# Patient Record
Sex: FEMALE | Race: ASIAN | NOT HISPANIC OR LATINO
[De-identification: names, ages, dates, MRNs, and addresses within clinical notes are randomized per-mention and may not be internally consistent; named-entity substitution may affect disease eponyms.]

---

## 2018-07-27 ENCOUNTER — APPOINTMENT (OUTPATIENT)
Dept: FAMILY MEDICINE | Facility: CLINIC | Age: 33
End: 2018-07-27
Payer: OTHER GOVERNMENT

## 2018-07-27 VITALS
OXYGEN SATURATION: 98 % | TEMPERATURE: 98.8 F | HEIGHT: 64 IN | DIASTOLIC BLOOD PRESSURE: 84 MMHG | SYSTOLIC BLOOD PRESSURE: 124 MMHG | WEIGHT: 139 LBS | BODY MASS INDEX: 23.73 KG/M2 | HEART RATE: 105 BPM

## 2018-07-27 DIAGNOSIS — Z78.9 OTHER SPECIFIED HEALTH STATUS: ICD-10-CM

## 2018-07-27 PROCEDURE — 99203 OFFICE O/P NEW LOW 30 MIN: CPT | Mod: 25

## 2018-07-27 PROCEDURE — G0444 DEPRESSION SCREEN ANNUAL: CPT | Mod: 59

## 2018-07-27 NOTE — PAST MEDICAL HISTORY
[Menstruating] : hx of menstruating [Approximately ___ (Month)] : the LMP was approximately [unfilled] month(s) ago

## 2018-07-27 NOTE — HISTORY OF PRESENT ILLNESS
[FreeTextEntry8] : here to establish. She found a lump in right lateral chest wall, mobile few months ago, it has increased in size since 2/2018, no pain . otherwise healthy.

## 2018-07-27 NOTE — COUNSELING
[Activity counseling provided] : activity [Behavioral health counseling provided] : behavioral health  [None] : None [Good understanding] : Patient has a good understanding of lifestyle changes and the steps needed to achieve self management goals [ - Annual Depression Screening] : Annual Depression Screening

## 2018-07-27 NOTE — PHYSICAL EXAM

## 2018-07-27 NOTE — HEALTH RISK ASSESSMENT
[No falls in past year] : Patient reported no falls in the past year [0] : 2) Feeling down, depressed, or hopeless: Not at all (0) [] : No [[id number]] : 0 Airway patent, Nasal mucosa clear. Mouth with normal mucosa. Throat has no vesicles, no oropharyngeal exudates and uvula is midline.

## 2018-07-30 LAB
ALBUMIN SERPL ELPH-MCNC: 4.5 G/DL
ALP BLD-CCNC: 70 U/L
ALT SERPL-CCNC: 10 U/L
ANION GAP SERPL CALC-SCNC: 15 MMOL/L
AST SERPL-CCNC: 19 U/L
BILIRUB SERPL-MCNC: 0.7 MG/DL
BUN SERPL-MCNC: 13 MG/DL
CALCIUM SERPL-MCNC: 9.7 MG/DL
CHLORIDE SERPL-SCNC: 100 MMOL/L
CO2 SERPL-SCNC: 26 MMOL/L
CREAT SERPL-MCNC: 0.77 MG/DL
GLUCOSE SERPL-MCNC: 85 MG/DL
POTASSIUM SERPL-SCNC: 4.2 MMOL/L
PROT SERPL-MCNC: 8.4 G/DL
SODIUM SERPL-SCNC: 141 MMOL/L

## 2018-08-01 ENCOUNTER — APPOINTMENT (OUTPATIENT)
Dept: SURGERY | Facility: CLINIC | Age: 33
End: 2018-08-01
Payer: OTHER GOVERNMENT

## 2018-08-01 ENCOUNTER — TRANSCRIPTION ENCOUNTER (OUTPATIENT)
Age: 33
End: 2018-08-01

## 2018-08-01 VITALS — BODY MASS INDEX: 26.4 KG/M2 | HEIGHT: 63 IN | WEIGHT: 149 LBS

## 2018-08-01 PROCEDURE — 99202 OFFICE O/P NEW SF 15 MIN: CPT

## 2018-08-03 LAB
APPEARANCE: ABNORMAL
BACTERIA: ABNORMAL
BASOPHILS # BLD AUTO: 0.01 K/UL
BASOPHILS NFR BLD AUTO: 0.2 %
BILIRUBIN URINE: NEGATIVE
BLOOD URINE: ABNORMAL
CHOLEST SERPL-MCNC: 175 MG/DL
CHOLEST/HDLC SERPL: 4.4 RATIO
COLOR: ABNORMAL
EOSINOPHIL # BLD AUTO: 0.06 K/UL
EOSINOPHIL NFR BLD AUTO: 1 %
FOLATE SERPL-MCNC: 16.4 NG/ML
GLUCOSE QUALITATIVE U: NEGATIVE MG/DL
HBA1C MFR BLD HPLC: 5.5 %
HCT VFR BLD CALC: 40.5 %
HDLC SERPL-MCNC: 40 MG/DL
HGB BLD-MCNC: 13.6 G/DL
HYALINE CASTS: 4 /LPF
IMM GRANULOCYTES NFR BLD AUTO: 0.2 %
KETONES URINE: ABNORMAL
LDLC SERPL CALC-MCNC: 114 MG/DL
LEUKOCYTE ESTERASE URINE: ABNORMAL
LYMPHOCYTES # BLD AUTO: 1.43 K/UL
LYMPHOCYTES NFR BLD AUTO: 22.7 %
MAN DIFF?: NORMAL
MCHC RBC-ENTMCNC: 30.5 PG
MCHC RBC-ENTMCNC: 33.6 GM/DL
MCV RBC AUTO: 90.8 FL
MICROSCOPIC-UA: NORMAL
MONOCYTES # BLD AUTO: 0.32 K/UL
MONOCYTES NFR BLD AUTO: 5.1 %
NEUTROPHILS # BLD AUTO: 4.47 K/UL
NEUTROPHILS NFR BLD AUTO: 70.8 %
NITRITE URINE: POSITIVE
PH URINE: 6
PLATELET # BLD AUTO: 332 K/UL
PROTEIN URINE: 100 MG/DL
RBC # BLD: 4.46 M/UL
RBC # FLD: 12.6 %
RED BLOOD CELLS URINE: 10 /HPF
SPECIFIC GRAVITY URINE: 1.03
SQUAMOUS EPITHELIAL CELLS: >27 /HPF
TRIGL SERPL-MCNC: 105 MG/DL
TSH SERPL-ACNC: 1.51 UIU/ML
URINE COMMENTS: NORMAL
UROBILINOGEN URINE: NEGATIVE MG/DL
VIT B12 SERPL-MCNC: 583 PG/ML
WBC # FLD AUTO: 6.3 K/UL
WHITE BLOOD CELLS URINE: 46 /HPF

## 2018-08-07 ENCOUNTER — LABORATORY RESULT (OUTPATIENT)
Age: 33
End: 2018-08-07

## 2018-08-08 ENCOUNTER — APPOINTMENT (OUTPATIENT)
Dept: SURGERY | Facility: CLINIC | Age: 33
End: 2018-08-08
Payer: OTHER GOVERNMENT

## 2018-08-08 ENCOUNTER — APPOINTMENT (OUTPATIENT)
Dept: FAMILY MEDICINE | Facility: CLINIC | Age: 33
End: 2018-08-08
Payer: OTHER GOVERNMENT

## 2018-08-08 VITALS
HEART RATE: 88 BPM | SYSTOLIC BLOOD PRESSURE: 120 MMHG | DIASTOLIC BLOOD PRESSURE: 78 MMHG | WEIGHT: 140 LBS | RESPIRATION RATE: 15 BRPM | TEMPERATURE: 98 F | BODY MASS INDEX: 24.8 KG/M2 | OXYGEN SATURATION: 98 % | HEIGHT: 63 IN

## 2018-08-08 PROCEDURE — 11402 EXC TR-EXT B9+MARG 1.1-2 CM: CPT

## 2018-08-08 PROCEDURE — 99214 OFFICE O/P EST MOD 30 MIN: CPT

## 2018-08-08 NOTE — COUNSELING
[Healthy eating counseling provided] : healthy eating [Activity counseling provided] : activity [Behavioral health counseling provided] : behavioral health  [___ min/wk activity recommended] : [unfilled] min/wk activity recommended [Walking] : Walking [Engage in a relaxing activity] : Engage in a relaxing activity [None] : None [Good understanding] : Patient has a good understanding of lifestyle changes and the steps needed to achieve self management goals

## 2018-08-08 NOTE — HISTORY OF PRESENT ILLNESS
[FreeTextEntry1] : here for f/u right chest wall mass.  [de-identified] : here for f/u right lateral chest wall mass.Mass is uncomfortable and will be removed today by surgeon.  denies fever, chills, urine pain burning, blood in urine, urgency. she has h/o proteinuria, so does her mother with normal renal function.

## 2018-08-08 NOTE — HEALTH RISK ASSESSMENT
[No falls in past year] : Patient reported no falls in the past year [0] : 2) Feeling down, depressed, or hopeless: Not at all (0) [] : No [[id number]] : 0

## 2018-08-08 NOTE — PLAN
[FreeTextEntry1] : surgical removal of lump today from right chest wall.  repeat blood tests, Urine test exclude infection, \par nephrology eval. \par  US kidneys to r/o structural abnormality. \par schedule CPE.

## 2018-08-13 LAB
ALBUMIN MFR SERPL ELPH: 56.5 %
ALBUMIN SERPL ELPH-MCNC: 4.4 G/DL
ALBUMIN SERPL-MCNC: 4.5 G/DL
ALBUMIN/GLOB SERPL: 1.3 RATIO
ALP BLD-CCNC: 66 U/L
ALPHA1 GLOB MFR SERPL ELPH: 4.1 %
ALPHA1 GLOB SERPL ELPH-MCNC: 0.3 G/DL
ALPHA2 GLOB MFR SERPL ELPH: 9.6 %
ALPHA2 GLOB SERPL ELPH-MCNC: 0.8 G/DL
ALT SERPL-CCNC: 11 U/L
ANION GAP SERPL CALC-SCNC: 13 MMOL/L
APPEARANCE: ABNORMAL
AST SERPL-CCNC: 16 U/L
B-GLOBULIN MFR SERPL ELPH: 10.7 %
B-GLOBULIN SERPL ELPH-MCNC: 0.8 G/DL
BACTERIA UR CULT: ABNORMAL
BACTERIA: ABNORMAL
BILIRUB SERPL-MCNC: 0.3 MG/DL
BILIRUBIN URINE: NEGATIVE
BLOOD URINE: ABNORMAL
BUN SERPL-MCNC: 17 MG/DL
C3 SERPL-MCNC: 115 MG/DL
C4 SERPL-MCNC: 44 MG/DL
CALCIUM OXALATE CRYSTALS: ABNORMAL
CALCIUM SERPL-MCNC: 9.4 MG/DL
CHLORIDE SERPL-SCNC: 101 MMOL/L
CO2 SERPL-SCNC: 25 MMOL/L
COLOR: YELLOW
CREAT SERPL-MCNC: 0.63 MG/DL
CREAT SPEC-SCNC: 192 MG/DL
CREAT SPEC-SCNC: 192 MG/DL
CREAT/PROT UR: 0.8 RATIO
CRP SERPL-MCNC: 0.26 MG/DL
DEPRECATED KAPPA LC FREE/LAMBDA SER: 0.97 RATIO
DEPRECATED KAPPA LC FREE/LAMBDA SER: 0.97 RATIO
ERYTHROCYTE [SEDIMENTATION RATE] IN BLOOD BY WESTERGREN METHOD: 27 MM/HR
GAMMA GLOB FLD ELPH-MCNC: 1.5 G/DL
GAMMA GLOB MFR SERPL ELPH: 19.1 %
GLUCOSE QUALITATIVE U: NEGATIVE MG/DL
GLUCOSE SERPL-MCNC: 81 MG/DL
HYALINE CASTS: 1 /LPF
IGA SER QL IEP: 178 MG/DL
IGG SER QL IEP: 1683 MG/DL
IGG4 SER-MCNC: 27.2 MG/DL
IGM SER QL IEP: 143 MG/DL
INTERPRETATION SERPL IEP-IMP: NORMAL
KAPPA LC CSF-MCNC: 1.82 MG/DL
KAPPA LC CSF-MCNC: 1.82 MG/DL
KAPPA LC SERPL-MCNC: 1.76 MG/DL
KAPPA LC SERPL-MCNC: 1.76 MG/DL
KETONES URINE: NEGATIVE
LEUKOCYTE ESTERASE URINE: NEGATIVE
M PROTEIN SPEC IFE-MCNC: NORMAL
MICROALBUMIN 24H UR DL<=1MG/L-MCNC: 87.1 MG/DL
MICROALBUMIN/CREAT 24H UR-RTO: 453 MG/G
MICROSCOPIC-UA: NORMAL
NITRITE URINE: NEGATIVE
PH URINE: 6.5
POTASSIUM SERPL-SCNC: 4.3 MMOL/L
PROT SERPL-MCNC: 7.9 G/DL
PROT UR-MCNC: 149 MG/DL
PROTEIN URINE: 300 MG/DL
RED BLOOD CELLS URINE: 71 /HPF
SODIUM SERPL-SCNC: 139 MMOL/L
SPECIFIC GRAVITY URINE: 1.03
SQUAMOUS EPITHELIAL CELLS: 14 /HPF
UROBILINOGEN URINE: 1 MG/DL
WHITE BLOOD CELLS URINE: 14 /HPF

## 2018-08-17 ENCOUNTER — APPOINTMENT (OUTPATIENT)
Dept: SURGERY | Facility: CLINIC | Age: 33
End: 2018-08-17
Payer: OTHER GOVERNMENT

## 2018-08-17 PROCEDURE — 99024 POSTOP FOLLOW-UP VISIT: CPT

## 2018-10-04 ENCOUNTER — APPOINTMENT (OUTPATIENT)
Dept: FAMILY MEDICINE | Facility: CLINIC | Age: 33
End: 2018-10-04
Payer: COMMERCIAL

## 2018-10-04 VITALS
HEIGHT: 64 IN | OXYGEN SATURATION: 98 % | BODY MASS INDEX: 24.59 KG/M2 | HEART RATE: 98 BPM | TEMPERATURE: 98.9 F | DIASTOLIC BLOOD PRESSURE: 84 MMHG | SYSTOLIC BLOOD PRESSURE: 124 MMHG | WEIGHT: 144 LBS

## 2018-10-04 DIAGNOSIS — Z23 ENCOUNTER FOR IMMUNIZATION: ICD-10-CM

## 2018-10-04 DIAGNOSIS — D17.9 BENIGN LIPOMATOUS NEOPLASM, UNSPECIFIED: ICD-10-CM

## 2018-10-04 DIAGNOSIS — Z12.4 ENCOUNTER FOR SCREENING FOR MALIGNANT NEOPLASM OF CERVIX: ICD-10-CM

## 2018-10-04 DIAGNOSIS — R77.8 OTHER SPECIFIED ABNORMALITIES OF PLASMA PROTEINS: ICD-10-CM

## 2018-10-04 PROCEDURE — G0008: CPT

## 2018-10-04 PROCEDURE — 99395 PREV VISIT EST AGE 18-39: CPT | Mod: 25

## 2018-10-04 PROCEDURE — G0444 DEPRESSION SCREEN ANNUAL: CPT

## 2018-10-04 PROCEDURE — 90656 IIV3 VACC NO PRSV 0.5 ML IM: CPT

## 2018-10-04 RX ORDER — NITROFURANTOIN (MONOHYDRATE/MACROCRYSTALS) 25; 75 MG/1; MG/1
100 CAPSULE ORAL
Qty: 14 | Refills: 0 | Status: DISCONTINUED | COMMUNITY
Start: 2018-08-15 | End: 2018-10-04

## 2018-10-04 NOTE — PLAN
[FreeTextEntry1] : preventive screening, healthy diet and exercise. \par hematology and nephrology. \par  f/u surgeon for 2 nd lipoma surgery. \par  flu vaccine given.

## 2018-10-04 NOTE — HEALTH RISK ASSESSMENT
[Good] : ~his/her~  mood as  good [Two or more falls in past year] : Patient reported two or more falls in the past year [0] : 2) Feeling down, depressed, or hopeless: Not at all (0) [Discussed at today's visit] : Advance Directives Discussed at today's visit [No changes since last discussed ___] : No changes since last discussed  [unfilled] [HIV Test offered] : HIV Test offered [Hepatitis C test offered] : Hepatitis C test offered [Feels Safe at Home] : Feels safe at home [Fully functional (bathing, dressing, toileting, transferring, walking, feeding)] : Fully functional (bathing, dressing, toileting, transferring, walking, feeding) [Fully functional (using the telephone, shopping, preparing meals, housekeeping, doing laundry, using] : Fully functional and needs no help or supervision to perform IADLs (using the telephone, shopping, preparing meals, housekeeping, doing laundry, using transportation, managing medications and managing finances) [Independent] : managing finances [None] : None [With Family] : lives with family [Employed] : employed [] :  [# Of Children ___] : has [unfilled] children [Sexually Active] : sexually active [Smoke Detector] : smoke detector [Carbon Monoxide Detector] : carbon monoxide detector [Seat Belt] :  uses seat belt [Sunscreen] : uses sunscreen [] : No [[id number]] : 0 [Change in mental status noted] : No change in mental status noted [Language] : denies difficulty with language [Reports changes in hearing] : Reports no changes in hearing [Reports changes in vision] : Reports no changes in vision [Reports changes in dental health] : Reports no changes in dental health [PapSmearDate] : never [FreeTextEntry2] :

## 2018-10-04 NOTE — HISTORY OF PRESENT ILLNESS
[FreeTextEntry1] : here for physical. [de-identified] : Here to have CPE. patient sees nephrologist.denies urinary symptoms. She wants to  get pregnant and had miscarriages in past. LMP 8/17/18. She was born in [place].

## 2018-10-04 NOTE — COUNSELING
[Healthy eating counseling provided] : healthy eating [Activity counseling provided] : activity [Behavioral health counseling provided] : behavioral health  [Decrease Portions] : Decrease food portions [___ min/wk activity recommended] : [unfilled] min/wk activity recommended [Walking] : Walking [Engage in a relaxing activity] : Engage in a relaxing activity [None] : None [Good understanding] : Patient has a good understanding of lifestyle changes and the steps needed to achieve self management goals [ - Annual Depression Screening] : Annual Depression Screening

## 2018-10-17 ENCOUNTER — APPOINTMENT (OUTPATIENT)
Dept: SURGERY | Facility: CLINIC | Age: 33
End: 2018-10-17
Payer: COMMERCIAL

## 2018-10-17 PROCEDURE — 99214 OFFICE O/P EST MOD 30 MIN: CPT

## 2018-10-22 ENCOUNTER — OUTPATIENT (OUTPATIENT)
Dept: OUTPATIENT SERVICES | Facility: HOSPITAL | Age: 33
LOS: 1 days | End: 2018-10-22
Payer: OTHER GOVERNMENT

## 2018-10-22 VITALS
TEMPERATURE: 99 F | RESPIRATION RATE: 16 BRPM | HEART RATE: 96 BPM | WEIGHT: 142.42 LBS | HEIGHT: 60 IN | SYSTOLIC BLOOD PRESSURE: 116 MMHG | DIASTOLIC BLOOD PRESSURE: 78 MMHG | OXYGEN SATURATION: 100 %

## 2018-10-22 VITALS — HEIGHT: 60 IN

## 2018-10-22 DIAGNOSIS — G89.29 OTHER CHRONIC PAIN: ICD-10-CM

## 2018-10-22 DIAGNOSIS — R80.9 PROTEINURIA, UNSPECIFIED: ICD-10-CM

## 2018-10-22 DIAGNOSIS — Z01.818 ENCOUNTER FOR OTHER PREPROCEDURAL EXAMINATION: ICD-10-CM

## 2018-10-22 DIAGNOSIS — D49.2 NEOPLASM OF UNSPECIFIED BEHAVIOR OF BONE, SOFT TISSUE, AND SKIN: ICD-10-CM

## 2018-10-22 DIAGNOSIS — Z98.890 OTHER SPECIFIED POSTPROCEDURAL STATES: Chronic | ICD-10-CM

## 2018-10-22 LAB
ANION GAP SERPL CALC-SCNC: 8 MMOL/L — SIGNIFICANT CHANGE UP (ref 5–17)
BUN SERPL-MCNC: 22 MG/DL — SIGNIFICANT CHANGE UP (ref 7–23)
CALCIUM SERPL-MCNC: 9.1 MG/DL — SIGNIFICANT CHANGE UP (ref 8.4–10.5)
CHLORIDE SERPL-SCNC: 103 MMOL/L — SIGNIFICANT CHANGE UP (ref 96–108)
CO2 SERPL-SCNC: 30 MMOL/L — SIGNIFICANT CHANGE UP (ref 22–31)
CREAT SERPL-MCNC: 0.7 MG/DL — SIGNIFICANT CHANGE UP (ref 0.5–1.3)
GLUCOSE SERPL-MCNC: 123 MG/DL — HIGH (ref 70–99)
HCG SERPL-ACNC: <1 MIU/ML — SIGNIFICANT CHANGE UP
HCT VFR BLD CALC: 40.9 % — SIGNIFICANT CHANGE UP (ref 34.5–45)
HGB BLD-MCNC: 13.2 G/DL — SIGNIFICANT CHANGE UP (ref 11.5–15.5)
MCHC RBC-ENTMCNC: 29.3 PG — SIGNIFICANT CHANGE UP (ref 27–34)
MCHC RBC-ENTMCNC: 32.2 GM/DL — SIGNIFICANT CHANGE UP (ref 32–36)
MCV RBC AUTO: 91 FL — SIGNIFICANT CHANGE UP (ref 80–100)
PLATELET # BLD AUTO: 265 K/UL — SIGNIFICANT CHANGE UP (ref 150–400)
POTASSIUM SERPL-MCNC: 3.8 MMOL/L — SIGNIFICANT CHANGE UP (ref 3.5–5.3)
POTASSIUM SERPL-SCNC: 3.8 MMOL/L — SIGNIFICANT CHANGE UP (ref 3.5–5.3)
RBC # BLD: 4.5 M/UL — SIGNIFICANT CHANGE UP (ref 3.8–5.2)
RBC # FLD: 12 % — SIGNIFICANT CHANGE UP (ref 10.3–14.5)
SODIUM SERPL-SCNC: 141 MMOL/L — SIGNIFICANT CHANGE UP (ref 135–145)
WBC # BLD: 5.4 K/UL — SIGNIFICANT CHANGE UP (ref 3.8–10.5)
WBC # FLD AUTO: 5.4 K/UL — SIGNIFICANT CHANGE UP (ref 3.8–10.5)

## 2018-10-22 PROCEDURE — 80048 BASIC METABOLIC PNL TOTAL CA: CPT

## 2018-10-22 PROCEDURE — 36415 COLL VENOUS BLD VENIPUNCTURE: CPT

## 2018-10-22 PROCEDURE — 84702 CHORIONIC GONADOTROPIN TEST: CPT

## 2018-10-22 PROCEDURE — G0463: CPT

## 2018-10-22 PROCEDURE — 85027 COMPLETE CBC AUTOMATED: CPT

## 2018-10-22 NOTE — H&P PST ADULT - NEGATIVE GENERAL GENITOURINARY SYMPTOMS
no hematuria/no nocturia/no bladder infections/no flank pain L/no flank pain R/no urinary hesitancy/no incontinence/normal urinary frequency/no renal colic/no dysuria

## 2018-10-22 NOTE — H&P PST ADULT - SKIN COMMENTS
small nontender mass to right lateral chest wall - no s/s infection/inflammation. Skin intact - site healed/closed s/p procedure done 8/2018

## 2018-10-22 NOTE — H&P PST ADULT - NSANTHOSAYNRD_GEN_A_CORE
No. PORTILLO screening performed.  STOP BANG Legend: 0-2 = LOW Risk; 3-4 = INTERMEDIATE Risk; 5-8 = HIGH Risk

## 2018-10-22 NOTE — H&P PST ADULT - PROBLEM SELECTOR PLAN 1
Excision of Mass Right Chest Wall scheduled for 11/1/2018.  Pre-op instructions given. Pt verbalized understanding  Chlorhexidine wash instructions will be given by RN  REJIG ordered STAT for day of procedure   Pending: Medical clearance (pt reports she has appt today with PCP) Excision of Mass Right Chest Wall scheduled for 11/1/2018.  Pre-op instructions given. Pt verbalized understanding  Chlorhexidine wash instructions will be given by RN  REJIG ordered STAT for day of procedure

## 2018-10-22 NOTE — H&P PST ADULT - HISTORY OF PRESENT ILLNESS
34yo female with medical h/o Proteinuria since childhood, reports Lipoma to right chest wall, and had same removed in 8/2018 in surgeon's office, but reports mass recurred. Pt presents today for presurgical testing for Excision of Mass Right Chest Wall scheduled for 11/1/2018.

## 2018-10-22 NOTE — H&P PST ADULT - MUSCULOSKELETAL
negative detailed exam normal strength/no joint swelling/no calf tenderness/no joint erythema/no joint warmth/ROM intact

## 2018-10-31 ENCOUNTER — TRANSCRIPTION ENCOUNTER (OUTPATIENT)
Age: 33
End: 2018-10-31

## 2018-11-01 ENCOUNTER — OUTPATIENT (OUTPATIENT)
Dept: OUTPATIENT SERVICES | Facility: HOSPITAL | Age: 33
LOS: 1 days | End: 2018-11-01
Payer: OTHER GOVERNMENT

## 2018-11-01 ENCOUNTER — RESULT REVIEW (OUTPATIENT)
Age: 33
End: 2018-11-01

## 2018-11-01 VITALS
DIASTOLIC BLOOD PRESSURE: 72 MMHG | SYSTOLIC BLOOD PRESSURE: 107 MMHG | RESPIRATION RATE: 16 BRPM | WEIGHT: 142.42 LBS | TEMPERATURE: 98 F | OXYGEN SATURATION: 99 % | HEIGHT: 60 IN | HEART RATE: 84 BPM

## 2018-11-01 VITALS
RESPIRATION RATE: 16 BRPM | TEMPERATURE: 98 F | SYSTOLIC BLOOD PRESSURE: 112 MMHG | DIASTOLIC BLOOD PRESSURE: 74 MMHG | OXYGEN SATURATION: 99 % | HEART RATE: 92 BPM

## 2018-11-01 DIAGNOSIS — G89.29 OTHER CHRONIC PAIN: ICD-10-CM

## 2018-11-01 DIAGNOSIS — D49.2 NEOPLASM OF UNSPECIFIED BEHAVIOR OF BONE, SOFT TISSUE, AND SKIN: ICD-10-CM

## 2018-11-01 DIAGNOSIS — Z98.890 OTHER SPECIFIED POSTPROCEDURAL STATES: Chronic | ICD-10-CM

## 2018-11-01 PROCEDURE — 88342 IMHCHEM/IMCYTCHM 1ST ANTB: CPT | Mod: 26

## 2018-11-01 PROCEDURE — 21555 EXC NECK LES SC < 3 CM: CPT

## 2018-11-01 PROCEDURE — 88307 TISSUE EXAM BY PATHOLOGIST: CPT | Mod: 26

## 2018-11-01 PROCEDURE — 21555 EXC NECK LES SC < 3 CM: CPT | Mod: 79

## 2018-11-01 PROCEDURE — 88307 TISSUE EXAM BY PATHOLOGIST: CPT

## 2018-11-01 PROCEDURE — 88342 IMHCHEM/IMCYTCHM 1ST ANTB: CPT

## 2018-11-01 RX ORDER — ACETAMINOPHEN 500 MG
650 TABLET ORAL EVERY 6 HOURS
Qty: 0 | Refills: 0 | Status: DISCONTINUED | OUTPATIENT
Start: 2018-11-01 | End: 2018-11-16

## 2018-11-01 RX ORDER — HYDROMORPHONE HYDROCHLORIDE 2 MG/ML
0.5 INJECTION INTRAMUSCULAR; INTRAVENOUS; SUBCUTANEOUS
Qty: 0 | Refills: 0 | Status: DISCONTINUED | OUTPATIENT
Start: 2018-11-01 | End: 2018-11-01

## 2018-11-01 RX ORDER — ONDANSETRON 8 MG/1
4 TABLET, FILM COATED ORAL ONCE
Qty: 0 | Refills: 0 | Status: DISCONTINUED | OUTPATIENT
Start: 2018-11-01 | End: 2018-11-01

## 2018-11-01 RX ORDER — SODIUM CHLORIDE 9 MG/ML
1000 INJECTION, SOLUTION INTRAVENOUS
Qty: 0 | Refills: 0 | Status: DISCONTINUED | OUTPATIENT
Start: 2018-11-01 | End: 2018-11-01

## 2018-11-01 RX ORDER — OXYCODONE HYDROCHLORIDE 5 MG/1
5 TABLET ORAL EVERY 6 HOURS
Qty: 0 | Refills: 0 | Status: DISCONTINUED | OUTPATIENT
Start: 2018-11-01 | End: 2018-11-01

## 2018-11-01 RX ADMIN — SODIUM CHLORIDE 50 MILLILITER(S): 9 INJECTION, SOLUTION INTRAVENOUS at 08:34

## 2018-11-01 NOTE — ASU PATIENT PROFILE, ADULT - NS TRANSFER DISPOSITION PATIENT BELONGINGS
Anticoagulation Summary  As of 9/6/2018    INR goal:   2.0-3.0   TTR:   45.6 % (2.5 y)   Today's INR:   4.0!   Warfarin maintenance plan:   5 mg (5 mg x 1) on Mon; 2.5 mg (5 mg x 0.5) all other days   Weekly warfarin total:   20 mg   Plan last modified:   Anna JeffersD (2/5/2018)   Next INR check:   9/13/2018   Target end date:   Indefinite    Indications    Anticoagulant long-term use [Z79.01]  Atypical atrial flutter (HCC) [I48.4]  Persistent atrial fibrillation (HCC) [I48.1]             Anticoagulation Episode Summary     INR check location:   Outside Lab    Preferred lab:       Send INR reminders to:       Comments:   [facility]      Anticoagulation Care Providers     Provider Role Specialty Phone number    Zain Winslow M.D. Referring Cardiac Electrophysiology [phone]    Anna LimaD Responsible          Anticoagulation Patient Findings    HPI:  Chirag Balderas, on anticoagulation therapy with warfarin for AF.   Changes to current medical/health status since last appt: none  Denies signs/symptoms of bleeding and/or thrombosis since the last appt.    Denies any interval changes to diet  Denies any interval changes to medications since last appt.   Denies any complications or cost restrictions with current therapy.     A/P   INR  SUPRA-therapeutic.   Discussed DOAC, pt will investigate and see if it's something he would like to try.   Hold warfarin today then Pt is to continue with current warfarin dosing regimen.     Next INR in 1 week(s).    Trevor Winston, PharmD   
given to family

## 2018-11-01 NOTE — ASU DISCHARGE PLAN (ADULT/PEDIATRIC). - SPECIAL INSTRUCTIONS
Ice pack to incision as needed for pain/swelling  May shower starting tomorrow, pat wound dry  Leave white steristrips in place  Tylenol 325mg Take 2 tablets by mouth every 6hrs if needed for mild pain (over the counter)    Percocet 5/325mg by mouth every 6hrs if needed for SEVERE pain

## 2018-11-01 NOTE — ASU DISCHARGE PLAN (ADULT/PEDIATRIC). - NOTIFY
Bleeding that does not stop/Persistent Nausea and Vomiting/Pain not relieved by Medications/Inability to Tolerate Liquids or Foods/Fever greater than 101/Unable to Urinate

## 2018-11-01 NOTE — ASU DISCHARGE PLAN (ADULT/PEDIATRIC). - NURSING INSTRUCTIONS
all discharge safety follow up care to MD. Taking of tylenol for mild pain and percocet for severe pain. Proper hand hygiene. Follow up with MD for post OP check up. Eat lightly today and advance diet as tolerated.

## 2018-11-01 NOTE — BRIEF OPERATIVE NOTE - PROCEDURE
<<-----Click on this checkbox to enter Procedure Excision of mass of right chest wall  11/01/2018    Active  [id number]

## 2018-11-06 LAB — SURGICAL PATHOLOGY STUDY: SIGNIFICANT CHANGE UP

## 2018-11-20 PROBLEM — R80.9 PROTEINURIA, UNSPECIFIED: Chronic | Status: ACTIVE | Noted: 2018-10-22

## 2018-11-21 ENCOUNTER — APPOINTMENT (OUTPATIENT)
Dept: SURGERY | Facility: CLINIC | Age: 33
End: 2018-11-21
Payer: COMMERCIAL

## 2018-11-21 PROCEDURE — 99024 POSTOP FOLLOW-UP VISIT: CPT

## 2018-11-26 ENCOUNTER — APPOINTMENT (OUTPATIENT)
Dept: FAMILY MEDICINE | Facility: CLINIC | Age: 33
End: 2018-11-26

## 2018-12-13 ENCOUNTER — APPOINTMENT (OUTPATIENT)
Dept: HEMATOLOGY ONCOLOGY | Facility: CLINIC | Age: 33
End: 2018-12-13
Payer: COMMERCIAL

## 2018-12-13 VITALS
DIASTOLIC BLOOD PRESSURE: 76 MMHG | BODY MASS INDEX: 27.82 KG/M2 | HEIGHT: 63 IN | HEART RATE: 97 BPM | TEMPERATURE: 98.9 F | WEIGHT: 157 LBS | SYSTOLIC BLOOD PRESSURE: 115 MMHG

## 2018-12-13 DIAGNOSIS — Z78.9 OTHER SPECIFIED HEALTH STATUS: ICD-10-CM

## 2018-12-13 DIAGNOSIS — Z87.59 PERSONAL HISTORY OF OTHER COMPLICATIONS OF PREGNANCY, CHILDBIRTH AND THE PUERPERIUM: ICD-10-CM

## 2018-12-13 PROCEDURE — 99244 OFF/OP CNSLTJ NEW/EST MOD 40: CPT

## 2018-12-13 NOTE — OB HISTORY
[Definite:  ___ (Date)] : the last menstrual period was [unfilled] [Normal Amount/Duration] : was of a normal amount and duration [___] : Living: [unfilled]

## 2018-12-14 NOTE — CONSULT LETTER
[Dear  ___] : Dear ~SALIMA, [Consult Letter:] : I had the pleasure of evaluating your patient, [unfilled]. [Please see my note below.] : Please see my note below. [Consult Closing:] : Thank you very much for allowing me to participate in the care of this patient.  If you have any questions, please do not hesitate to contact me. [Sincerely,] : Sincerely, [FreeTextEntry2] : Sravani Quezada M.D. [FreeTextEntry3] : BRIDGET MORENO M.D.\par Hematology/ Oncology\par [facility]\par \par

## 2018-12-14 NOTE — ASSESSMENT
[FreeTextEntry1] : Ms. Li 's questions were answered to her satisfaction. She expressed her understanding and willingness to comply with above recommendations, and  will return to PCP for further follow up of proteinuria.\par

## 2018-12-14 NOTE — HISTORY OF PRESENT ILLNESS
[de-identified] : 33 [place]- born female  referred for hematologic consult due to increased protein in urine. Patient follows up with nephrology, and protein studies ( SPEP and immunofixation) showed no evidence of plasma cell dyscrasia. Patient has difficult time conceiving, and had miscarriages in the first trimester in the past.In August 2018 she was treated for Klebsiella UTI ; patient also had a right chest wall lipoma removed by Dr. Yin, in his office..At the time of procedure, a much deeper mass was noted extended into the intercostal space. In November 2018, Dr. Yin removed residual right chest wall mass, consistent with a 1.5 cm schwannoma. [FreeTextEntry1] : expectant surveillance\par

## 2018-12-18 ENCOUNTER — TRANSCRIPTION ENCOUNTER (OUTPATIENT)
Age: 33
End: 2018-12-18

## 2018-12-24 ENCOUNTER — TRANSCRIPTION ENCOUNTER (OUTPATIENT)
Age: 33
End: 2018-12-24

## 2018-12-26 ENCOUNTER — APPOINTMENT (OUTPATIENT)
Dept: FAMILY MEDICINE | Facility: CLINIC | Age: 33
End: 2018-12-26
Payer: COMMERCIAL

## 2018-12-26 PROCEDURE — 90715 TDAP VACCINE 7 YRS/> IM: CPT

## 2018-12-26 PROCEDURE — 90471 IMMUNIZATION ADMIN: CPT

## 2019-03-04 ENCOUNTER — TRANSCRIPTION ENCOUNTER (OUTPATIENT)
Age: 34
End: 2019-03-04

## 2019-03-18 NOTE — ASU DISCHARGE PLAN (ADULT/PEDIATRIC). - MEDICATION SUMMARY - MEDICATIONS TO TAKE
with patient I will START or STAY ON the medications listed below when I get home from the hospital:    oxyCODONE-acetaminophen 5 mg-325 mg oral tablet  -- 1 tab(s) by mouth every 6 hours, As Needed -for severe pain MDD:4 tabs   -- Caution federal law prohibits the transfer of this drug to any person other  than the person for whom it was prescribed.  May cause drowsiness.  Alcohol may intensify this effect.  Use care when operating dangerous machinery.  This prescription cannot be refilled.  This product contains acetaminophen.  Do not use  with any other product containing acetaminophen to prevent possible liver damage.  Using more of this medication than prescribed may cause serious breathing problems.    -- Indication: For severe pain

## 2019-07-23 ENCOUNTER — APPOINTMENT (OUTPATIENT)
Dept: FAMILY MEDICINE | Facility: CLINIC | Age: 34
End: 2019-07-23

## 2019-09-25 ENCOUNTER — APPOINTMENT (OUTPATIENT)
Dept: FAMILY MEDICINE | Facility: CLINIC | Age: 34
End: 2019-09-25
Payer: OTHER GOVERNMENT

## 2019-09-25 PROCEDURE — G0008: CPT

## 2019-09-25 PROCEDURE — 90674 CCIIV4 VAC NO PRSV 0.5 ML IM: CPT

## 2019-10-18 RX ORDER — ASPIRIN/CALCIUM CARB/MAGNESIUM 324 MG
1 TABLET ORAL
Qty: 0 | Refills: 0 | DISCHARGE
Start: 2019-10-18 | End: 2019-10-19

## 2019-10-19 RX ORDER — ACETAMINOPHEN 500 MG
1 TABLET ORAL
Qty: 0 | Refills: 0 | DISCHARGE
Start: 2019-10-19 | End: 2019-10-20

## 2019-10-20 ENCOUNTER — INPATIENT (INPATIENT)
Facility: HOSPITAL | Age: 34
LOS: 3 days | Discharge: HOME | DRG: 872 | End: 2019-10-24
Attending: INTERNAL MEDICINE | Admitting: INTERNAL MEDICINE
Payer: COMMERCIAL

## 2019-10-20 VITALS
DIASTOLIC BLOOD PRESSURE: 86 MMHG | RESPIRATION RATE: 24 BRPM | TEMPERATURE: 103 F | HEART RATE: 120 BPM | SYSTOLIC BLOOD PRESSURE: 136 MMHG | WEIGHT: 119.93 LBS | HEIGHT: 63 IN | OXYGEN SATURATION: 99 %

## 2019-10-20 DIAGNOSIS — Z98.890 OTHER SPECIFIED POSTPROCEDURAL STATES: Chronic | ICD-10-CM

## 2019-10-20 DIAGNOSIS — N12 TUBULO-INTERSTITIAL NEPHRITIS, NOT SPECIFIED AS ACUTE OR CHRONIC: ICD-10-CM

## 2019-10-20 LAB
ALBUMIN SERPL ELPH-MCNC: 3.1 G/DL — LOW (ref 3.3–5)
ALP SERPL-CCNC: 73 U/L — SIGNIFICANT CHANGE UP (ref 30–120)
ALT FLD-CCNC: 24 U/L DA — SIGNIFICANT CHANGE UP (ref 10–60)
ANION GAP SERPL CALC-SCNC: 9 MMOL/L — SIGNIFICANT CHANGE UP (ref 5–17)
APPEARANCE UR: ABNORMAL
APPEARANCE UR: CLEAR — SIGNIFICANT CHANGE UP
APTT BLD: 32 SEC — SIGNIFICANT CHANGE UP (ref 28.5–37)
AST SERPL-CCNC: 20 U/L — SIGNIFICANT CHANGE UP (ref 10–40)
BASOPHILS # BLD AUTO: 0 K/UL — SIGNIFICANT CHANGE UP (ref 0–0.2)
BASOPHILS NFR BLD AUTO: 0 % — SIGNIFICANT CHANGE UP (ref 0–2)
BILIRUB SERPL-MCNC: 0.8 MG/DL — SIGNIFICANT CHANGE UP (ref 0.2–1.2)
BILIRUB UR-MCNC: NEGATIVE — SIGNIFICANT CHANGE UP
BILIRUB UR-MCNC: NEGATIVE — SIGNIFICANT CHANGE UP
BUN SERPL-MCNC: 10 MG/DL — SIGNIFICANT CHANGE UP (ref 7–23)
CALCIUM SERPL-MCNC: 8.8 MG/DL — SIGNIFICANT CHANGE UP (ref 8.4–10.5)
CHLORIDE SERPL-SCNC: 101 MMOL/L — SIGNIFICANT CHANGE UP (ref 96–108)
CO2 SERPL-SCNC: 27 MMOL/L — SIGNIFICANT CHANGE UP (ref 22–31)
COLOR SPEC: YELLOW — SIGNIFICANT CHANGE UP
COLOR SPEC: YELLOW — SIGNIFICANT CHANGE UP
CREAT SERPL-MCNC: 0.85 MG/DL — SIGNIFICANT CHANGE UP (ref 0.5–1.3)
DIFF PNL FLD: ABNORMAL
DIFF PNL FLD: ABNORMAL
EOSINOPHIL # BLD AUTO: 0 K/UL — SIGNIFICANT CHANGE UP (ref 0–0.5)
EOSINOPHIL NFR BLD AUTO: 0 % — SIGNIFICANT CHANGE UP (ref 0–6)
FLU A RESULT: SIGNIFICANT CHANGE UP
FLU A RESULT: SIGNIFICANT CHANGE UP
FLUAV AG NPH QL: SIGNIFICANT CHANGE UP
FLUBV AG NPH QL: SIGNIFICANT CHANGE UP
GLUCOSE SERPL-MCNC: 149 MG/DL — HIGH (ref 70–99)
GLUCOSE UR QL: NEGATIVE MG/DL — SIGNIFICANT CHANGE UP
GLUCOSE UR QL: NEGATIVE MG/DL — SIGNIFICANT CHANGE UP
HBA1C BLD-MCNC: 5.3 % — SIGNIFICANT CHANGE UP (ref 4–5.6)
HCG SERPL-ACNC: 1 MIU/ML — SIGNIFICANT CHANGE UP
HCT VFR BLD CALC: 34.6 % — SIGNIFICANT CHANGE UP (ref 34.5–45)
HGB BLD-MCNC: 11.5 G/DL — SIGNIFICANT CHANGE UP (ref 11.5–15.5)
INR BLD: 1.2 RATIO — HIGH (ref 0.88–1.16)
KETONES UR-MCNC: ABNORMAL
KETONES UR-MCNC: NEGATIVE — SIGNIFICANT CHANGE UP
LACTATE SERPL-SCNC: 1.5 MMOL/L — SIGNIFICANT CHANGE UP (ref 0.7–2)
LACTATE SERPL-SCNC: 2.9 MMOL/L — HIGH (ref 0.7–2)
LEUKOCYTE ESTERASE UR-ACNC: ABNORMAL
LEUKOCYTE ESTERASE UR-ACNC: ABNORMAL
LYMPHOCYTES # BLD AUTO: 0.66 K/UL — LOW (ref 1–3.3)
LYMPHOCYTES # BLD AUTO: 5 % — LOW (ref 13–44)
MAGNESIUM SERPL-MCNC: 1.7 MG/DL — SIGNIFICANT CHANGE UP (ref 1.6–2.6)
MCHC RBC-ENTMCNC: 29.8 PG — SIGNIFICANT CHANGE UP (ref 27–34)
MCHC RBC-ENTMCNC: 33.2 GM/DL — SIGNIFICANT CHANGE UP (ref 32–36)
MCV RBC AUTO: 89.6 FL — SIGNIFICANT CHANGE UP (ref 80–100)
MONOCYTES # BLD AUTO: 0.8 K/UL — SIGNIFICANT CHANGE UP (ref 0–0.9)
MONOCYTES NFR BLD AUTO: 6 % — SIGNIFICANT CHANGE UP (ref 2–14)
NEUTROPHILS # BLD AUTO: 11.81 K/UL — HIGH (ref 1.8–7.4)
NEUTROPHILS NFR BLD AUTO: 80 % — HIGH (ref 43–77)
NITRITE UR-MCNC: NEGATIVE — SIGNIFICANT CHANGE UP
NITRITE UR-MCNC: NEGATIVE — SIGNIFICANT CHANGE UP
NRBC # BLD: SIGNIFICANT CHANGE UP /100 WBCS (ref 0–0)
PH UR: 6.5 — SIGNIFICANT CHANGE UP (ref 5–8)
PH UR: 8 — SIGNIFICANT CHANGE UP (ref 5–8)
PLATELET # BLD AUTO: 199 K/UL — SIGNIFICANT CHANGE UP (ref 150–400)
POTASSIUM SERPL-MCNC: 3.1 MMOL/L — LOW (ref 3.5–5.3)
POTASSIUM SERPL-SCNC: 3.1 MMOL/L — LOW (ref 3.5–5.3)
PROT SERPL-MCNC: 7.5 G/DL — SIGNIFICANT CHANGE UP (ref 6–8.3)
PROT UR-MCNC: 100 MG/DL
PROT UR-MCNC: 30 MG/DL
PROTHROM AB SERPL-ACNC: 13.2 SEC — HIGH (ref 10–12.9)
RBC # BLD: 3.86 M/UL — SIGNIFICANT CHANGE UP (ref 3.8–5.2)
RBC # FLD: 12.8 % — SIGNIFICANT CHANGE UP (ref 10.3–14.5)
RSV RESULT: SIGNIFICANT CHANGE UP
RSV RNA RESP QL NAA+PROBE: SIGNIFICANT CHANGE UP
SODIUM SERPL-SCNC: 137 MMOL/L — SIGNIFICANT CHANGE UP (ref 135–145)
SP GR SPEC: 1 — LOW (ref 1.01–1.02)
SP GR SPEC: 1.01 — SIGNIFICANT CHANGE UP (ref 1.01–1.02)
UROBILINOGEN FLD QL: 4 MG/DL
UROBILINOGEN FLD QL: NEGATIVE MG/DL — SIGNIFICANT CHANGE UP
WBC # BLD: 13.27 K/UL — HIGH (ref 3.8–10.5)
WBC # FLD AUTO: 13.27 K/UL — HIGH (ref 3.8–10.5)

## 2019-10-20 PROCEDURE — 93010 ELECTROCARDIOGRAM REPORT: CPT

## 2019-10-20 PROCEDURE — 74176 CT ABD & PELVIS W/O CONTRAST: CPT | Mod: 26

## 2019-10-20 PROCEDURE — 99285 EMERGENCY DEPT VISIT HI MDM: CPT

## 2019-10-20 PROCEDURE — 99223 1ST HOSP IP/OBS HIGH 75: CPT

## 2019-10-20 PROCEDURE — 71046 X-RAY EXAM CHEST 2 VIEWS: CPT | Mod: 26

## 2019-10-20 RX ORDER — SODIUM CHLORIDE 9 MG/ML
1000 INJECTION INTRAMUSCULAR; INTRAVENOUS; SUBCUTANEOUS
Refills: 0 | Status: DISCONTINUED | OUTPATIENT
Start: 2019-10-20 | End: 2019-10-24

## 2019-10-20 RX ORDER — SODIUM CHLORIDE 9 MG/ML
1700 INJECTION INTRAMUSCULAR; INTRAVENOUS; SUBCUTANEOUS ONCE
Refills: 0 | Status: COMPLETED | OUTPATIENT
Start: 2019-10-20 | End: 2019-10-20

## 2019-10-20 RX ORDER — CEFTRIAXONE 500 MG/1
1000 INJECTION, POWDER, FOR SOLUTION INTRAMUSCULAR; INTRAVENOUS ONCE
Refills: 0 | Status: COMPLETED | OUTPATIENT
Start: 2019-10-20 | End: 2019-10-20

## 2019-10-20 RX ORDER — ACETAMINOPHEN 500 MG
650 TABLET ORAL ONCE
Refills: 0 | Status: COMPLETED | OUTPATIENT
Start: 2019-10-20 | End: 2019-10-20

## 2019-10-20 RX ORDER — CEFTRIAXONE 500 MG/1
1000 INJECTION, POWDER, FOR SOLUTION INTRAMUSCULAR; INTRAVENOUS EVERY 24 HOURS
Refills: 0 | Status: DISCONTINUED | OUTPATIENT
Start: 2019-10-21 | End: 2019-10-21

## 2019-10-20 RX ORDER — POTASSIUM CHLORIDE 20 MEQ
40 PACKET (EA) ORAL ONCE
Refills: 0 | Status: COMPLETED | OUTPATIENT
Start: 2019-10-20 | End: 2019-10-20

## 2019-10-20 RX ORDER — OXYCODONE AND ACETAMINOPHEN 5; 325 MG/1; MG/1
1 TABLET ORAL EVERY 6 HOURS
Refills: 0 | Status: DISCONTINUED | OUTPATIENT
Start: 2019-10-20 | End: 2019-10-24

## 2019-10-20 RX ORDER — ENOXAPARIN SODIUM 100 MG/ML
40 INJECTION SUBCUTANEOUS DAILY
Refills: 0 | Status: DISCONTINUED | OUTPATIENT
Start: 2019-10-20 | End: 2019-10-24

## 2019-10-20 RX ORDER — ACETAMINOPHEN 500 MG
650 TABLET ORAL EVERY 6 HOURS
Refills: 0 | Status: DISCONTINUED | OUTPATIENT
Start: 2019-10-20 | End: 2019-10-24

## 2019-10-20 RX ORDER — KETOROLAC TROMETHAMINE 30 MG/ML
15 SYRINGE (ML) INJECTION ONCE
Refills: 0 | Status: DISCONTINUED | OUTPATIENT
Start: 2019-10-20 | End: 2019-10-20

## 2019-10-20 RX ORDER — PANTOPRAZOLE SODIUM 20 MG/1
40 TABLET, DELAYED RELEASE ORAL ONCE
Refills: 0 | Status: COMPLETED | OUTPATIENT
Start: 2019-10-20 | End: 2019-10-20

## 2019-10-20 RX ADMIN — SODIUM CHLORIDE 100 MILLILITER(S): 9 INJECTION INTRAMUSCULAR; INTRAVENOUS; SUBCUTANEOUS at 16:00

## 2019-10-20 RX ADMIN — Medication 40 MILLIEQUIVALENT(S): at 12:00

## 2019-10-20 RX ADMIN — Medication 650 MILLIGRAM(S): at 23:56

## 2019-10-20 RX ADMIN — Medication 15 MILLIGRAM(S): at 14:59

## 2019-10-20 RX ADMIN — Medication 650 MILLIGRAM(S): at 11:04

## 2019-10-20 RX ADMIN — Medication 650 MILLIGRAM(S): at 15:59

## 2019-10-20 RX ADMIN — Medication 650 MILLIGRAM(S): at 16:04

## 2019-10-20 RX ADMIN — Medication 15 MILLIGRAM(S): at 15:25

## 2019-10-20 RX ADMIN — PANTOPRAZOLE SODIUM 40 MILLIGRAM(S): 20 TABLET, DELAYED RELEASE ORAL at 15:59

## 2019-10-20 RX ADMIN — OXYCODONE AND ACETAMINOPHEN 1 TABLET(S): 5; 325 TABLET ORAL at 22:02

## 2019-10-20 RX ADMIN — SODIUM CHLORIDE 1700 MILLILITER(S): 9 INJECTION INTRAMUSCULAR; INTRAVENOUS; SUBCUTANEOUS at 13:10

## 2019-10-20 RX ADMIN — CEFTRIAXONE 100 MILLIGRAM(S): 500 INJECTION, POWDER, FOR SOLUTION INTRAMUSCULAR; INTRAVENOUS at 13:30

## 2019-10-20 RX ADMIN — Medication 650 MILLIGRAM(S): at 23:04

## 2019-10-20 RX ADMIN — SODIUM CHLORIDE 1700 MILLILITER(S): 9 INJECTION INTRAMUSCULAR; INTRAVENOUS; SUBCUTANEOUS at 12:00

## 2019-10-20 RX ADMIN — ENOXAPARIN SODIUM 40 MILLIGRAM(S): 100 INJECTION SUBCUTANEOUS at 16:00

## 2019-10-20 RX ADMIN — CEFTRIAXONE 1000 MILLIGRAM(S): 500 INJECTION, POWDER, FOR SOLUTION INTRAMUSCULAR; INTRAVENOUS at 14:00

## 2019-10-20 RX ADMIN — OXYCODONE AND ACETAMINOPHEN 1 TABLET(S): 5; 325 TABLET ORAL at 22:50

## 2019-10-20 RX ADMIN — SODIUM CHLORIDE 100 MILLILITER(S): 9 INJECTION INTRAMUSCULAR; INTRAVENOUS; SUBCUTANEOUS at 21:09

## 2019-10-20 RX ADMIN — Medication 650 MILLIGRAM(S): at 12:00

## 2019-10-20 NOTE — H&P ADULT - NSICDXFAMILYHX_GEN_ALL_CORE_FT
FAMILY HISTORY:  No pertinent family history in first degree relatives all other ROS negative except as per HPI

## 2019-10-20 NOTE — ED PROVIDER NOTE - NONTENDER LOCATION
left upper quadrant/left lower quadrant/umbilical/left costovertebral angle/right upper quadrant/right lower quadrant/periumbilical/suprapubic

## 2019-10-20 NOTE — CONSULT NOTE ADULT - SUBJECTIVE AND OBJECTIVE BOX
SUBJECTIVE: (***)        OBJECTIVE  ROS:     PHYSICAL EXAM: Tele-evaluation precludes physical exam. Pertinent physical exam findings as per verbal communication by Dr. Tyler and Malaika, nurse at bedside are as following:     Vital Signs Last 24 Hrs  T(C): 37.8 (20 Oct 2019 13:30), Max: 39.4 (20 Oct 2019 10:48)  T(F): 100.1 (20 Oct 2019 13:30), Max: 103 (20 Oct 2019 10:48)  HR: 104 (20 Oct 2019 14:00) (101 - 120)  BP: 109/66 (20 Oct 2019 14:00) (109/66 - 136/86)  RR: 22 (20 Oct 2019 14:00) (22 - 24)  SpO2: 99% (20 Oct 2019 14:00) (99% - 100%)    Back pain            LABS AND IMAGING DATA:                        11.5   13. )-----------( 199      ( 20 Oct 2019 11:18 )             34.6     10-20    137  |  101  |  10  ----------------------------<  149<H>  3.1<L>   |  27  |  0.85    Ca    8.8      20 Oct 2019 11:18    TPro  7.5  /  Alb  3.1<L>  /  TBili  0.8  /  DBili  x   /  AST  20  /  ALT  24  /  AlkPhos  73  10-20    Urinalysis Basic - ( 20 Oct 2019 13:00 )    Color: Yellow / Appearance: Slightly Turbid / S.010 / pH: x  Gluc: x / Ketone: Negative  / Bili: Negative / Urobili: 4 mg/dL   Blood: x / Protein: 100 mg/dL / Nitrite: Negative   Leuk Esterase: Small / RBC: 11-25 /HPF / WBC 6-10   Sq Epi: x / Non Sq Epi: Moderate / Bacteria: Many    < from: CT Renal Stone Hunt (10.20.19 @ 12:12) >  EXAM:  CT RENAL STONE HUNT                                  PROCEDURE DATE:  10/20/2019          INTERPRETATION:  CLINICAL INFORMATION: Bilateral flank pain.    COMPARISON: None.    PROCEDURE:   CT of the Abdomen and Pelvis was performed without intravenous contrast.   Intravenous contrast: None.  Oral contrast: None.  Sagittal and coronal reformats were performed.    FINDINGS:    LOWER CHEST: Within normal limits.    LIVER: Within normal limits.  BILE DUCTS: Normal caliber.  GALLBLADDER: Within normal limits.  SPLEEN: Within normal limits.  PANCREAS: Within normal limits.  ADRENALS: Within normal limits.  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   No hydroureteronephrosis or obstructing ureteral calculus is noted.  There is a small, subcentimeter fatty lesion interpolar right kidney   likely reflecting angiomyolipoma.    There is minimal right perinephric stranding with mild stranding   extending along the posterior flank.    BLADDER: Within normal limits.  REPRODUCTIVE ORGANS: Uterus and adnexa unremarkable as visualized.    BOWEL: Evaluation of the stomach and gastrointestinal tract is limited   without distention.    No bowel obstruction is noted.   Appendix normal appearing.  PERITONEUM: No ascites.  VESSELS:   There are punctate calcifications within the periportal and   peripancreatic region, which may be related to vasculature.  There is mild haziness surrounding the proximal extent of the superior   mesenteric artery.  The abdominal aorta is normal in caliber.  RETROPERITONEUM/LYMPH NODES: No lymphadenopathy.    ABDOMINAL WALL: Within normal limits.  BONES: Within normal limits.    IMPRESSION:     Haziness surrounding the superior mesenteric artery with small vascular   calcifications in the right upper quadrant/ periportal region. Recommend   further clinical correlation for a vasculitis.    Mild right perinephric stranding, correlate clinically for urinary tract   infection.  No CT evidence for obstructive uropathy.    Other findings as discussed.    ASHOK PORTER M.D., ATTENDING RADIOLOGIST  This document has been electronically signed. Oct 20 2019 12:35PM  < end of copied text >            ASSESSMENT AND PLAN: (***)    Care plan discussed with (***) SUBJECTIVE:  Pt is a 35yo female with Pmhx : proteinuria who presents with fever, body aches for 3 days.  She c/o fever with chills and diffuse body aches , including joint pains since 3 days ago, feeling worse today.   She reported right flank pain without radiation , also back pains and decreased appetite. She took ASA and tylenol 3x a day for the last 2 days.  She denies cough, chest pain, sob, denies n/v, no dysuria or burning micturition  She reports having to strain to urinate for the last few days, and 1 loose stool 3 days ago.  Pt reports she works as an  and admits she does not drink enough water or fluids.  She denies being sexually active.  LMP was last week.     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  Denies tobacco,drug use.  Pt lives with her roommate. She works as an   and admits she does not drink enough water or fluids.  She denies being sexually active.        PHYSICAL EXAM: Tele-evaluation precludes physical exam. Pertinent physical exam findings as per verbal communication by Dr. Tyler and Malaika, nurse at bedside are as following:     Vital Signs Last 24 Hrs  T(C): 37.8 (20 Oct 2019 13:30), Max: 39.4 (20 Oct 2019 10:48)  T(F): 100.1 (20 Oct 2019 13:30), Max: 103 (20 Oct 2019 10:48)  HR: 104 (20 Oct 2019 14:00) (101 - 120)  BP: 109/66 (20 Oct 2019 14:00) (109/66 - 136/86)  RR: 22 (20 Oct 2019 14:00) (22 - 24)  SpO2: 99% (20 Oct 2019 14:00) (99% - 100%)    Back pain            LABS AND IMAGING DATA:                        11.5   13.27 )-----------( 199      ( 20 Oct 2019 11:18 )             34.6     10-20    137  |  101  |  10  ----------------------------<  149<H>  3.1<L>   |  27  |  0.85    Ca    8.8      20 Oct 2019 11:18    TPro  7.5  /  Alb  3.1<L>  /  TBili  0.8  /  DBili  x   /  AST  20  /  ALT  24  /  AlkPhos  73  10-20    Urinalysis Basic - ( 20 Oct 2019 13:00 )    Color: Yellow / Appearance: Slightly Turbid / S.010 / pH: x  Gluc: x / Ketone: Negative  / Bili: Negative / Urobili: 4 mg/dL   Blood: x / Protein: 100 mg/dL / Nitrite: Negative   Leuk Esterase: Small / RBC: 11-25 /HPF / WBC 6-10   Sq Epi: x / Non Sq Epi: Moderate / Bacteria: Many    < from: CT Renal Stone Hunt (10.20.19 @ 12:12) >  EXAM:  CT RENAL STONE HUNT                          PROCEDURE DATE:  10/20/2019      INTERPRETATION:  CLINICAL INFORMATION: Bilateral flank pain.    COMPARISON: None.  PROCEDURE:   CT of the Abdomen and Pelvis was performed without intravenous contrast.   Intravenous contrast: None.  Oral contrast: None.  Sagittal and coronal reformats were performed.    FINDINGS:    LOWER CHEST: Within normal limits.    LIVER: Within normal limits.  BILE DUCTS: Normal caliber.  GALLBLADDER: Within normal limits.  SPLEEN: Within normal limits.  PANCREAS: Within normal limits.  ADRENALS: Within normal limits.  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   No hydroureteronephrosis or obstructing ureteral calculus is noted.  There is a small, subcentimeter fatty lesion interpolar right kidney   likely reflecting angiomyolipoma.    There is minimal right perinephric stranding with mild stranding   extending along the posterior flank.    BLADDER: Within normal limits.  REPRODUCTIVE ORGANS: Uterus and adnexa unremarkable as visualized.    BOWEL: Evaluation of the stomach and gastrointestinal tract is limited   without distention.    No bowel obstruction is noted.   Appendix normal appearing.  PERITONEUM: No ascites.  VESSELS:   There are punctate calcifications within the periportal and   peripancreatic region, which may be related to vasculature.  There is mild haziness surrounding the proximal extent of the superior   mesenteric artery.  The abdominal aorta is normal in caliber.  RETROPERITONEUM/LYMPH NODES: No lymphadenopathy.    ABDOMINAL WALL: Within normal limits.  BONES: Within normal limits.    IMPRESSION:     Haziness surrounding the superior mesenteric artery with small vascular   calcifications in the right upper quadrant/ periportal region. Recommend   further clinical correlation for a vasculitis.    Mild right perinephric stranding, correlate clinically for urinary tract   infection.  No CT evidence for obstructive uropathy.    Other findings as discussed.    ASHOK PORTER M.D., ATTENDING RADIOLOGIST  This document has been electronically signed. Oct 20 2019 12:35PM  < end of copied text >

## 2019-10-20 NOTE — H&P ADULT - HISTORY OF PRESENT ILLNESS
pt is a 35yo female with no significant pmhx presents with fever, chills and diffuse body aches since Friday worse today. pt reports right flank pain without radiation with associated nausea. pt did not take anything for symptoms. pt denies cp, sob, v/d, dysuria.

## 2019-10-20 NOTE — ED PROVIDER NOTE - CLINICAL SUMMARY MEDICAL DECISION MAKING FREE TEXT BOX
pt with fever, flank pain. will do labs per sepsis protocol, ct renal stone hunt, ivf, tylenol ua, consider abx

## 2019-10-20 NOTE — CONSULT NOTE ADULT - ASSESSMENT
Pt is admitted w/    Fever  Sepsis due to Pyelonephritis, UTI. Leukocytosis and lactate elevation noted  Hypokalemia  Hyperglycemia  CT abnormality  - s/p 1800 ml NS in the ED, cont. IVF  - s/p Rocephin 1 gm in the ED, will cont.  - repeat lactate  - follow blood cx  - repeat ua and u cx as specimen in the ED appears contaminated  - s/p Kdur in the ED, repeat dose  - s/p tylenol , will cont.  Pt declines Morphine.  - repeat fasting glucose in the AM, and HgA1C  - review with radiologist re: CT scan re: Haziness surrounding the superior mesenteric artery with small vascular calcifications in the right upper quadrant/ periportal region. Recommend   further clinical correlation for a vasculitis.  - DVT prophylaxis: heparin Pt is admitted w/    Fever  Sepsis due to Pyelonephritis, UTI. Leukocytosis and lactate elevation noted.    Possible passed nephrolithiasis,  blood noted in ua and pt reports straining  Hypokalemia  Hyperglycemia  CT abnormality  - s/p 1800 ml NS in the ED, cont. IVF  - s/p Rocephin 1 gm in the ED, will cont.  - repeat lactate  - follow blood cx  - repeat ua and u cx as specimen in the ED appears contaminated  - s/p Kdur in the ED, repeat dose  - s/p toradol and tylenol , will cont. tylenol.  Pt declines Morphine.  - repeat fasting glucose in the AM, and HgA1C  - need to review with radiologist re: CT scan re: Haziness surrounding the superior mesenteric artery with small vascular calcifications in the right upper quadrant/ periportal region. Recommend   further clinical correlation for a vasculitis.  - DVT prophylaxis: heparin

## 2019-10-20 NOTE — H&P ADULT - ASSESSMENT
pt is a 33yo female with no significant pmhx presents with body aches x days. pt reports tactile fever with chills and diffuse body aches since friday worse today. pt reports right flank pain without radiation with associated nausea. pt did not take anything for symptoms. pt denies cp, sob, v/d, dysuria.    Acute Pyelonephritis    Admit to medicine.  IV rocephin q24hs.  f/u culture results.  IVF.  Pain meds.   ID consult .  f/u HbA1c.    ?Vasculitis on CT A/P  will discuss with radiology.    Plan of care was discuss with patient, allquestions were answered, seems understand and in agreement. pt is a 33yo female with no significant pmhx presents with body aches x days. pt reports tactile fever with chills and diffuse body aches since friday worse today. pt reports right flank pain without radiation with associated nausea. pt did not take anything for symptoms. pt denies cp, sob, v/d, dysuria.    Acute Pyelonephritis    Admit to medicine.  IV rocephin q24hs.  f/u culture results.  IVF.  Pain meds.   ID consult .  f/u HbA1c.    ?Vasculitis on CT A/P  will discuss with radiology.    Plan of care was discuss with patient, allquestions were answered, seems understand and in agreement.    IMPROVE VTE Individual Risk Assessment          RISK                                                          Points    [  ] Previous VTE                                                3    [  ] Thrombophilia                                             2    [  ] Lower limb paralysis                                    2        (unable to hold up >15 seconds)      [  ] Current Cancer                                             2         (within 6 months)    [ x ] Immobilization > 24 hrs                              1    [  ] ICU/CCU stay > 24 hours                            1    [  ] Age > 60                                                    1    IMPROVE VTE Score _____1____

## 2019-10-20 NOTE — ED PROVIDER NOTE - NS ED MD DISPO SPECIAL CONSIDERATION1
None

## 2019-10-20 NOTE — H&P ADULT - NSHPSOCIALHISTORY_GEN_ALL_CORE
SOCIAL HISTORY:  Smoker:   NO        PACK YEARS:                         WHEN QUIT?  ETOH use:   NO               FREQUENCY / QUANTITY:  Ilicit Drug use:   NO

## 2019-10-20 NOTE — ED ADULT TRIAGE NOTE - HEIGHT IN CM
[address] [facility]  [place] [address]  Phone: [phone]  Fax: [phone]    RADHA Dugan CNP        April 1, 2019     Patient: Do Whyte   YOB: 2011   Date of Visit: 4/1/2019       To Whom it May Concern:    Ramakrishna Paulson was seen in my clinic on 4/1/2019. He may return to school on April 2, 2019. If you have any questions or concerns, please don't hesitate to call.     Sincerely,         RADHA Dugan CNP 160.02

## 2019-10-20 NOTE — ED PROVIDER NOTE - OBJECTIVE STATEMENT
pt is a 35yo female with no significant pmhx presents with body aches x days. pt reports tactile fever with chills and diffuse body aches since friday worse today. pt reports right flank pain without radiation with associated nausea. pt did not take anything for symptoms. pt denies cp, sob, v/d, dysuria.

## 2019-10-20 NOTE — CONSULT NOTE ADULT - NSHPATTENDINGPLANDISCUSS_GEN_ALL_CORE
pt and RNMalaika at bedside.  Pt's evaluation and plan reviewed with admitting hospitalist Dr. Arianne Tyler.

## 2019-10-20 NOTE — PATIENT PROFILE ADULT - ARRIVAL FROM
HPI     Patient here for comprehensive eye exam     Patient complains of not seeing as well in th distance with his glasses as   he did a few years ago, pt states it has really become worse this last   year. OS worse than OD.     (-)drops  (-)pain  (-)flashes  (-)floaters  (-)diplopia    Diabetic no  LBS n/a  No results found for: HGBA1C    OCULAR HISTORY  Last Eye Exam Dr. Cespedes 05/30/14 pt may have had exam in 2015 not sure   (-)eye surgery   (-)eye injury   (-)diagnosed or treated for any eye conditions or diseases none     FAMILY HISTORY  (+)Glaucoma maternal grandmother   (-)Macular Degeneration none          Last edited by Gifty Weeks, OD on 2/3/2017  2:01 PM.         Assessment /Plan     For exam results, see Encounter Report.    Blurred vision, left eye  Bilateral myopia   Small increase in myopia OS, stable OD. New glasses prescription released, adaptation expected.    Eyeglass Final Rx     Eyeglass Final Rx      Sphere Cylinder   Right -1.00 Sphere   Left -1.25 Sphere       Type:  SVL    Expiration Date:  2/4/2018                 RTC 1 year                 
Home

## 2019-10-20 NOTE — ED PROVIDER NOTE - PROGRESS NOTE DETAILS
Leah Ashraf for attending Dr. Gonzalez: 35 y/o female c/o fever, back pain, and body aches for 2 days. Denies cough, SOB, nausea, or vomiting. Had one episode of diarrhea at home. 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 Takes no daily medication. Physical exam: Temperature is 103. Blood pressure is 136/86. Lungs are clear. Heart normal. Abd soft, nontender. ?CVA tenderness b/l. Extremities full ROM intact. Neuro has no deficits. Skin is warm and dry. Plan: Labs, urine, sepsis workup, and consider CT stone hunt. I Keri Ashraf attest that this documentation has been prepared under the direction and in the presence of Doctor Gonzalez.

## 2019-10-20 NOTE — ED ADULT NURSE NOTE - CHPI ED NUR SYMPTOMS NEG
no bowel dysfunction/no difficulty bearing weight/no fatigue/no constipation/no anorexia/no numbness

## 2019-10-21 LAB
ANION GAP SERPL CALC-SCNC: 9 MMOL/L — SIGNIFICANT CHANGE UP (ref 5–17)
BASOPHILS # BLD AUTO: 0.01 K/UL — SIGNIFICANT CHANGE UP (ref 0–0.2)
BASOPHILS NFR BLD AUTO: 0.1 % — SIGNIFICANT CHANGE UP (ref 0–2)
BUN SERPL-MCNC: 5 MG/DL — LOW (ref 7–23)
CALCIUM SERPL-MCNC: 7.7 MG/DL — LOW (ref 8.4–10.5)
CHLORIDE SERPL-SCNC: 103 MMOL/L — SIGNIFICANT CHANGE UP (ref 96–108)
CO2 SERPL-SCNC: 24 MMOL/L — SIGNIFICANT CHANGE UP (ref 22–31)
CREAT SERPL-MCNC: 0.58 MG/DL — SIGNIFICANT CHANGE UP (ref 0.5–1.3)
CULTURE RESULTS: SIGNIFICANT CHANGE UP
EOSINOPHIL # BLD AUTO: 0.01 K/UL — SIGNIFICANT CHANGE UP (ref 0–0.5)
EOSINOPHIL NFR BLD AUTO: 0.1 % — SIGNIFICANT CHANGE UP (ref 0–6)
GLUCOSE SERPL-MCNC: 129 MG/DL — HIGH (ref 70–99)
HCT VFR BLD CALC: 29.7 % — LOW (ref 34.5–45)
HGB BLD-MCNC: 9.9 G/DL — LOW (ref 11.5–15.5)
IMM GRANULOCYTES NFR BLD AUTO: 0.6 % — SIGNIFICANT CHANGE UP (ref 0–1.5)
LACTATE SERPL-SCNC: 1.1 MMOL/L — SIGNIFICANT CHANGE UP (ref 0.7–2)
LYMPHOCYTES # BLD AUTO: 0.52 K/UL — LOW (ref 1–3.3)
LYMPHOCYTES # BLD AUTO: 4.2 % — LOW (ref 13–44)
MCHC RBC-ENTMCNC: 29.7 PG — SIGNIFICANT CHANGE UP (ref 27–34)
MCHC RBC-ENTMCNC: 33.3 GM/DL — SIGNIFICANT CHANGE UP (ref 32–36)
MCV RBC AUTO: 89.2 FL — SIGNIFICANT CHANGE UP (ref 80–100)
MONOCYTES # BLD AUTO: 1.57 K/UL — HIGH (ref 0–0.9)
MONOCYTES NFR BLD AUTO: 12.6 % — SIGNIFICANT CHANGE UP (ref 2–14)
NEUTROPHILS # BLD AUTO: 10.3 K/UL — HIGH (ref 1.8–7.4)
NEUTROPHILS NFR BLD AUTO: 82.4 % — HIGH (ref 43–77)
NRBC # BLD: 0 /100 WBCS — SIGNIFICANT CHANGE UP (ref 0–0)
PLATELET # BLD AUTO: 173 K/UL — SIGNIFICANT CHANGE UP (ref 150–400)
POTASSIUM SERPL-MCNC: 2.9 MMOL/L — CRITICAL LOW (ref 3.5–5.3)
POTASSIUM SERPL-SCNC: 2.9 MMOL/L — CRITICAL LOW (ref 3.5–5.3)
RBC # BLD: 3.33 M/UL — LOW (ref 3.8–5.2)
RBC # FLD: 12.7 % — SIGNIFICANT CHANGE UP (ref 10.3–14.5)
SODIUM SERPL-SCNC: 136 MMOL/L — SIGNIFICANT CHANGE UP (ref 135–145)
SPECIMEN SOURCE: SIGNIFICANT CHANGE UP
WBC # BLD: 12.49 K/UL — HIGH (ref 3.8–10.5)
WBC # FLD AUTO: 12.49 K/UL — HIGH (ref 3.8–10.5)

## 2019-10-21 PROCEDURE — 99233 SBSQ HOSP IP/OBS HIGH 50: CPT

## 2019-10-21 RX ORDER — POTASSIUM CHLORIDE 20 MEQ
10 PACKET (EA) ORAL
Refills: 0 | Status: COMPLETED | OUTPATIENT
Start: 2019-10-21 | End: 2019-10-21

## 2019-10-21 RX ORDER — MEROPENEM 1 G/30ML
1000 INJECTION INTRAVENOUS EVERY 8 HOURS
Refills: 0 | Status: DISCONTINUED | OUTPATIENT
Start: 2019-10-21 | End: 2019-10-24

## 2019-10-21 RX ORDER — POTASSIUM CHLORIDE 20 MEQ
40 PACKET (EA) ORAL ONCE
Refills: 0 | Status: COMPLETED | OUTPATIENT
Start: 2019-10-21 | End: 2019-10-21

## 2019-10-21 RX ADMIN — OXYCODONE AND ACETAMINOPHEN 1 TABLET(S): 5; 325 TABLET ORAL at 15:40

## 2019-10-21 RX ADMIN — MEROPENEM 100 MILLIGRAM(S): 1 INJECTION INTRAVENOUS at 17:14

## 2019-10-21 RX ADMIN — Medication 100 MILLIEQUIVALENT(S): at 13:42

## 2019-10-21 RX ADMIN — Medication 100 MILLIEQUIVALENT(S): at 10:49

## 2019-10-21 RX ADMIN — Medication 650 MILLIGRAM(S): at 15:11

## 2019-10-21 RX ADMIN — Medication 40 MILLIEQUIVALENT(S): at 09:27

## 2019-10-21 RX ADMIN — Medication 650 MILLIGRAM(S): at 06:15

## 2019-10-21 RX ADMIN — OXYCODONE AND ACETAMINOPHEN 1 TABLET(S): 5; 325 TABLET ORAL at 15:11

## 2019-10-21 RX ADMIN — Medication 650 MILLIGRAM(S): at 14:15

## 2019-10-21 RX ADMIN — CEFTRIAXONE 100 MILLIGRAM(S): 500 INJECTION, POWDER, FOR SOLUTION INTRAMUSCULAR; INTRAVENOUS at 11:30

## 2019-10-21 RX ADMIN — Medication 650 MILLIGRAM(S): at 05:35

## 2019-10-21 RX ADMIN — MEROPENEM 100 MILLIGRAM(S): 1 INJECTION INTRAVENOUS at 22:19

## 2019-10-21 RX ADMIN — Medication 100 MILLIEQUIVALENT(S): at 08:56

## 2019-10-21 NOTE — PROVIDER CONTACT NOTE (CRITICAL VALUE NOTIFICATION) - ACTION/TREATMENT ORDERED:
Tylenol given for temp. Percocet given for flank pain. Rocephin changed to Merapemun. Cool wash cloth to axilla and groin. repeat temp in hour, reassess pain in one hour.
Additional NS bolus and repeat lactate
KCL 10meg  rider in 100ml D5W over an hour, every 2 hours x3.  KCL 40meg PO x1. Repeat labs.

## 2019-10-21 NOTE — PROVIDER CONTACT NOTE (CRITICAL VALUE NOTIFICATION) - ASSESSMENT
Alert. Oriented. Chills. Flank pain. B/P stable. Awaiting cultures.
Alert. oriented. VSS. Nicholas reg diet. Drinking a lot of water. Denies diarrrea.

## 2019-10-21 NOTE — PROGRESS NOTE ADULT - ASSESSMENT
pt is a 33yo female with no significant pmhx presents with body aches x days. pt reports tactile fever with chills and diffuse body aches since friday worse today. pt reports right flank pain without radiation with associated nausea. pt did not take anything for symptoms. pt denies cp, sob, v/d, dysuria.    Acute Pyelonephritis  IV rocephin q24hs.  f/u culture results.  IVF.  Pain meds.   ID consult noted.  f/u HbA1c.    ?Vasculitis on CT A/P  w/u can be done as an out patient.    Plan of care was discuss with patient, allquestions were answered, seems understand and in agreement. pt is a 35yo female with no significant pmhx presents with body aches x days. pt reports tactile fever with chills and diffuse body aches since friday worse today. pt reports right flank pain without radiation with associated nausea. pt did not take anything for symptoms. pt denies cp, sob, v/d, dysuria.    Acute Pyelonephritis  IV rocephin q24hs.  f/u culture results.  IVF.  Pain meds.   ID consult noted.  f/u HbA1c.    Hypokalemia  replete and check bmp in am.    ?Vasculitis on CT A/P  w/u can be done as an out patient.    Plan of care was discuss with patient, allquestions were answered, seems understand and in agreement.

## 2019-10-21 NOTE — PROVIDER CONTACT NOTE (CRITICAL VALUE NOTIFICATION) - BACKGROUND
Admitted 10/20 with temp and flank pain, being treated with Rocephin for nephritis.
Pt admitted on 10/20 with flank pain and fever. Dx with nephritis. Pt getting IVF with LR at 100ml. Received 2L bolus NS in ER 10/20.

## 2019-10-21 NOTE — PROGRESS NOTE ADULT - SUBJECTIVE AND OBJECTIVE BOX
Patient is a 34y old  Female who presents with a chief complaint of back pain since last Friday. (21 Oct 2019 08:42)      INTERVAL HPI/OVERNIGHT EVENTS:  Pt is seen and examined.  feels better then yesterday.  Back pain is improving.    Pain Location & Control:     MEDICATIONS  (STANDING):  cefTRIAXone   IVPB 1000 milliGRAM(s) IV Intermittent every 24 hours  enoxaparin Injectable 40 milliGRAM(s) SubCutaneous daily  potassium chloride  10 mEq/100 mL IVPB 10 milliEquivalent(s) IV Intermittent every 2 hours  sodium chloride 0.9%. 1000 milliLiter(s) (100 mL/Hr) IV Continuous <Continuous>    MEDICATIONS  (PRN):  acetaminophen   Tablet .. 650 milliGRAM(s) Oral every 6 hours PRN Temp greater or equal to 38.5C (101.3F), Mild Pain (1 - 3)  oxyCODONE    5 mG/acetaminophen 325 mG 1 Tablet(s) Oral every 6 hours PRN Moderate Pain (4 - 6)      Allergies    penicillins (Pruritus; Rash)    Intolerances            Vital Signs Last 24 Hrs  T(C): 37.9 (21 Oct 2019 09:02), Max: 39.4 (20 Oct 2019 10:48)  T(F): 100.3 (21 Oct 2019 09:02), Max: 103 (20 Oct 2019 10:48)  HR: 100 (21 Oct 2019 09:02) (97 - 120)  BP: 105/67 (21 Oct 2019 09:02) (100/64 - 136/86)  BP(mean): --  RR: 18 (21 Oct 2019 09:02) (18 - 26)  SpO2: 98% (21 Oct 2019 09:02) (67% - 100%)        LABS:                        9.9    12.49 )-----------( 173      ( 21 Oct 2019 07:19 )             29.7     21 Oct 2019 07:19    136    |  103    |  5      ----------------------------<  129    2.9     |  24     |  0.58     Ca    7.7        21 Oct 2019 07:19  Mg     1.7       20 Oct 2019 15:19    TPro  7.5    /  Alb  3.1    /  TBili  0.8    /  DBili  x      /  AST  20     /  ALT  24     /  AlkPhos  73     20 Oct 2019 11:18    PT/INR - ( 20 Oct 2019 11:18 )   PT: 13.2 sec;   INR: 1.20 ratio         PTT - ( 20 Oct 2019 11:18 )  PTT:32.0 sec  Urinalysis Basic - ( 20 Oct 2019 15:08 )    Color: Yellow / Appearance: Clear / S.005 / pH: x  Gluc: x / Ketone: Moderate  / Bili: Negative / Urobili: Negative mg/dL   Blood: x / Protein: 30 mg/dL / Nitrite: Negative   Leuk Esterase: Trace / RBC: 25-50 /HPF / WBC 3-5   Sq Epi: x / Non Sq Epi: Few / Bacteria: Few      CAPILLARY BLOOD GLUCOSE            Cultures    Lactate, Blood: 1.1 mmol/L (10-21 @ 07:19)  Lactate, Blood: 1.5 mmol/L (10-20 @ 13:39)  Lactate, Blood: 2.9 mmol/L (10-20 @ 11:18)        RADIOLOGY & ADDITIONAL TESTS:    Imaging Personally Reviewed:  [ ] YES  [ ] NO    Consultant(s) Notes Reviewed:  [ ] YES  [ ] NO    Care Discussed with Consultants/Other Providers [ ] YES  [ ] NO

## 2019-10-21 NOTE — CONSULT NOTE ADULT - SUBJECTIVE AND OBJECTIVE BOX
Infectious Diseases Consult by Leander Montenegro MD    Reason for Consult : Right sided pyelonephritis    HPI:  pt is a 35yo female with no significant PMH presents with fever, chills and diffuse body aches since Friday worse today. pt reports right flank pain without radiation with associated nausea. pt did not take anything for symptoms. pt denies cp, sob, v/d, dysuria. (20 Oct 2019 14:58)        Past Medical & Surgical Hx:  PAST MEDICAL & SURGICAL HISTORY:  Proteinuria  History of D&C: miscarriage       Social History--    EtOH: denies   Tobacco: denies   Drug Use: denies     Travel/Environmental/Occupational History:  *** inserth T/E/O Hx ***    FAMILY HISTORY:  No pertinent family history in first degree relatives      Allergies    penicillins (Pruritus; Rash)    Intolerances        Home/ Out patient  Medications :  Home Medications:  aspirin 325 mg oral tablet: 1 dose(s) orally 3 times a day, As Needed (20 Oct 2019 14:54)  Tylenol 500 mg oral tablet: 1 dose(s) orally 3 times a day (20 Oct 2019 14:54)      Current Inpatient Medications :    ANTIBIOTICS:   cefTRIAXone   IVPB 1000 milliGRAM(s) IV Intermittent every 24 hours      OTHER RELEVANT MEDICATIONS :  acetaminophen   Tablet .. 650 milliGRAM(s) Oral every 6 hours PRN  enoxaparin Injectable 40 milliGRAM(s) SubCutaneous daily  oxyCODONE    5 mG/acetaminophen 325 mG 1 Tablet(s) Oral every 6 hours PRN  potassium chloride    Tablet ER 40 milliEquivalent(s) Oral once  potassium chloride  10 mEq/100 mL IVPB 10 milliEquivalent(s) IV Intermittent every 2 hours  sodium chloride 0.9%. 1000 milliLiter(s) IV Continuous <Continuous>        ROS:  CONSTITUTIONAL:  Negative fever or chills, feels well, good appetite  EYES:  Negative  blurry vision or double vision  CARDIOVASCULAR:  Negative for chest pain or palpitations  RESPIRATORY:  Negative for cough, wheezing, or SOB   GASTROINTESTINAL:  Negative for nausea, vomiting, diarrhea, constipation, or abdominal pain  GENITOURINARY:  Positive for  frequency, urgency , dysuria or hematuria   NEUROLOGIC:  No headache, confusion, dizziness, lightheadedness  All other systems were reviewed and are negative          I&O's Detail    20 Oct 2019 07:01  -  21 Oct 2019 07:00  --------------------------------------------------------  IN:    sodium chloride 0.9%.: 1100 mL  Total IN: 1100 mL    OUT:  Total OUT: 0 mL    Total NET: 1100 mL          Physical Exam:  Vital Signs Last 24 Hrs  T(C): 37.4 (21 Oct 2019 05:21), Max: 39.4 (20 Oct 2019 10:48)  T(F): 99.4 (21 Oct 2019 05:21), Max: 103 (20 Oct 2019 10:48)  HR: 97 (21 Oct 2019 05:21) (97 - 120)  BP: 115/79 (21 Oct 2019 05:21) (100/64 - 136/86)  BP(mean): --  RR: 18 (21 Oct 2019 05:21) (18 - 26)  SpO2: 98% (21 Oct 2019 05:21) (67% - 100%)  Height (cm): 160.02 (10-20 @ 10:48)  Weight (kg): 54.4 (10-20 @ 10:48)  BMI (kg/m2): 21.2 (10-20 @ 10:48)  BSA (m2): 1.56 (10-20 @ 10:48)    General: well developed well nourished, in no acute distress  Eyes: sclera anicteric, pupils equal and reactive to light  ENMT: buccal mucosa moist, pharynx not injected  Neck: supple, trachea midline  Lungs: clear, no wheeze/rhonchi  Cardiovascular: regular rate and rhythm, S1 S2  Abdomen: soft, nontender, no organomegaly present, bowel sounds normal, right CVA tenderness   Neurological:  alert and oriented x3, Cranial Nerves II-XII grossly intact  Skin:no increased ecchymosis/petechiae/purpura  Lymph Nodes: no palpable cervical/supraclavicular lymph nodes enlargements  Extremities: no cyanosis/clubbing/edema    Labs:                             9.9    12.49  )----------(  173       ( 21 Oct 2019 07:19 )               29.7      136    |  103    |  5      ----------------------------<  129        ( 21 Oct 2019 07:19 )  2.9     |  24     |  0.58     Ca    7.7        ( 21 Oct 2019 07:19 )  Mg     1.7       ( 20 Oct 2019 15:19 )    Lactate, Blood: 1.1 mmol/L (10-21-19 @ 07:19)  Lactate, Blood: 1.5 mmol/L (10-20-19 @ 13:39)  Lactate, Blood: 2.9 mmol/L (10-20-19 @ 11:18)        Urinalysis Basic - ( 20 Oct 2019 15:08 )    Color: Yellow / Appearance: Clear / S.005 / pH: x  Gluc: x / Ketone: Moderate  / Bili: Negative / Urobili: Negative mg/dL   Blood: x / Protein: 30 mg/dL / Nitrite: Negative   Leuk Esterase: Trace / RBC: 25-50 /HPF / WBC 3-5   Sq Epi: x / Non Sq Epi: Few / Bacteria: Few        RADIOLOGY & ADDITIONAL STUDIES:  Xray Chest 2 Views PA/Lat (10.20.19 @ 13:06) >    FINDINGS:    Lungs: The lungs are clear.  Heart: The heart is normal in size.  Mediastinum: The mediastinum is within normal limits.    IMPRESSION:    Clear lungs.    CT Renal Stone Hunt (10.20.19 @ 12:12) >  FINDINGS:    LOWER CHEST: Within normal limits.    LIVER: Within normal limits.  BILE DUCTS: Normal caliber.  GALLBLADDER: Within normal limits.  SPLEEN: Within normal limits.  PANCREAS: Within normal limits.  ADRENALS: Within normal limits.  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   No hydroureteronephrosis or obstructing ureteral calculus is noted.  There is a small, subcentimeter fatty lesion interpolar right kidney   likely reflecting angiomyolipoma.    There is minimal right perinephric stranding with mild stranding   extending along the posterior flank.    BLADDER: Within normal limits.  REPRODUCTIVE ORGANS: Uterus and adnexa unremarkable as visualized.    BOWEL: Evaluation of the stomach and gastrointestinal tract is limited   without distention.    No bowel obstruction is noted.   Appendix normal appearing.  PERITONEUM: No ascites.  VESSELS:   There are punctate calcifications within the periportal and   peripancreatic region, which may be related to vasculature.  There is mild haziness surrounding the proximal extent of the superior   mesenteric artery.  The abdominal aorta is normal in caliber.  RETROPERITONEUM/LYMPH NODES: No lymphadenopathy.    ABDOMINAL WALL: Within normal limits.  BONES: Within normal limits.    IMPRESSION:     Haziness surrounding the superior mesenteric artery with small vascular   calcifications in the right upper quadrant/ periportal region. Recommend   further clinical correlation for a vasculitis.    Mild right perinephric stranding, correlate clinically for urinary tract   infection.  No CT evidence for obstructive uropathy.    Other findings as discussed.    Assessment :   35yo female with no significant PMH presents with fever, chills and diffuse body aches since Friday worse today associated with dysuria , has right flank pain, CT stone study suspects right pyelonephritis ,    has allergy to PCN but tolerated Rocephin   Plan :   - continue with Rocpehin pending cs   - replace potassium  - expect patient to have bacteremia based on her presentation     Continue with present regime .  Approptiate use of antibiotics and adverse effects reviewed.      I have discussed the above plan of care with patient/family in detail. They expressed understanding of the treatment plan . Risks, benefits and alternatives discussed in detail. I have asked if they have any questions or concerns and appropriately addressed them to the best of my ability .      > 75 minutes spent in direct patient care reviewing  the notes, lab data/ imaging , discussion with multidisciplinary team. All questions were addressed and answered to the best of my capacity .    Thank you for allowing me to participate in the care of your patient .      Leander Montenegro MD  [phone]

## 2019-10-22 LAB
ANION GAP SERPL CALC-SCNC: 7 MMOL/L — SIGNIFICANT CHANGE UP (ref 5–17)
APPEARANCE UR: CLEAR — SIGNIFICANT CHANGE UP
BACTERIA # UR AUTO: ABNORMAL
BILIRUB UR-MCNC: NEGATIVE — SIGNIFICANT CHANGE UP
BUN SERPL-MCNC: 4 MG/DL — LOW (ref 7–23)
CALCIUM SERPL-MCNC: 8.2 MG/DL — LOW (ref 8.4–10.5)
CHLORIDE SERPL-SCNC: 104 MMOL/L — SIGNIFICANT CHANGE UP (ref 96–108)
CO2 SERPL-SCNC: 26 MMOL/L — SIGNIFICANT CHANGE UP (ref 22–31)
COLOR SPEC: YELLOW — SIGNIFICANT CHANGE UP
CREAT SERPL-MCNC: 0.67 MG/DL — SIGNIFICANT CHANGE UP (ref 0.5–1.3)
CRP SERPL-MCNC: 15.07 MG/DL — HIGH (ref 0–0.4)
DIFF PNL FLD: ABNORMAL
EPI CELLS # UR: SIGNIFICANT CHANGE UP
ERYTHROCYTE [SEDIMENTATION RATE] IN BLOOD: 71 MM/HR — HIGH (ref 0–20)
GLUCOSE SERPL-MCNC: 107 MG/DL — HIGH (ref 70–99)
GLUCOSE UR QL: NEGATIVE MG/DL — SIGNIFICANT CHANGE UP
HCT VFR BLD CALC: 30.1 % — LOW (ref 34.5–45)
HGB BLD-MCNC: 9.9 G/DL — LOW (ref 11.5–15.5)
KETONES UR-MCNC: NEGATIVE — SIGNIFICANT CHANGE UP
LEUKOCYTE ESTERASE UR-ACNC: NEGATIVE — SIGNIFICANT CHANGE UP
MCHC RBC-ENTMCNC: 29.2 PG — SIGNIFICANT CHANGE UP (ref 27–34)
MCHC RBC-ENTMCNC: 32.9 GM/DL — SIGNIFICANT CHANGE UP (ref 32–36)
MCV RBC AUTO: 88.8 FL — SIGNIFICANT CHANGE UP (ref 80–100)
NITRITE UR-MCNC: NEGATIVE — SIGNIFICANT CHANGE UP
NRBC # BLD: 0 /100 WBCS — SIGNIFICANT CHANGE UP (ref 0–0)
PH UR: 8 — SIGNIFICANT CHANGE UP (ref 5–8)
PLATELET # BLD AUTO: 204 K/UL — SIGNIFICANT CHANGE UP (ref 150–400)
POTASSIUM SERPL-MCNC: 3.5 MMOL/L — SIGNIFICANT CHANGE UP (ref 3.5–5.3)
POTASSIUM SERPL-SCNC: 3.5 MMOL/L — SIGNIFICANT CHANGE UP (ref 3.5–5.3)
PROCALCITONIN SERPL-MCNC: 0.6 NG/ML — HIGH (ref 0.02–0.1)
PROT UR-MCNC: NEGATIVE MG/DL — SIGNIFICANT CHANGE UP
RBC # BLD: 3.39 M/UL — LOW (ref 3.8–5.2)
RBC # FLD: 12.6 % — SIGNIFICANT CHANGE UP (ref 10.3–14.5)
RBC CASTS # UR COMP ASSIST: ABNORMAL /HPF (ref 0–4)
SODIUM SERPL-SCNC: 137 MMOL/L — SIGNIFICANT CHANGE UP (ref 135–145)
SP GR SPEC: 1.01 — SIGNIFICANT CHANGE UP (ref 1.01–1.02)
UROBILINOGEN FLD QL: NEGATIVE MG/DL — SIGNIFICANT CHANGE UP
WBC # BLD: 8.75 K/UL — SIGNIFICANT CHANGE UP (ref 3.8–10.5)
WBC # FLD AUTO: 8.75 K/UL — SIGNIFICANT CHANGE UP (ref 3.8–10.5)
WBC UR QL: SIGNIFICANT CHANGE UP

## 2019-10-22 PROCEDURE — 71260 CT THORAX DX C+: CPT | Mod: 26

## 2019-10-22 PROCEDURE — 76700 US EXAM ABDOM COMPLETE: CPT | Mod: 26

## 2019-10-22 PROCEDURE — 74177 CT ABD & PELVIS W/CONTRAST: CPT | Mod: 26

## 2019-10-22 PROCEDURE — 99233 SBSQ HOSP IP/OBS HIGH 50: CPT

## 2019-10-22 RX ORDER — LACTOBACILLUS ACIDOPHILUS 100MM CELL
1 CAPSULE ORAL
Refills: 0 | Status: DISCONTINUED | OUTPATIENT
Start: 2019-10-22 | End: 2019-10-24

## 2019-10-22 RX ORDER — IOHEXOL 300 MG/ML
30 INJECTION, SOLUTION INTRAVENOUS ONCE
Refills: 0 | Status: COMPLETED | OUTPATIENT
Start: 2019-10-22 | End: 2019-10-22

## 2019-10-22 RX ADMIN — ENOXAPARIN SODIUM 40 MILLIGRAM(S): 100 INJECTION SUBCUTANEOUS at 12:26

## 2019-10-22 RX ADMIN — IOHEXOL 30 MILLILITER(S): 300 INJECTION, SOLUTION INTRAVENOUS at 06:14

## 2019-10-22 RX ADMIN — SODIUM CHLORIDE 100 MILLILITER(S): 9 INJECTION INTRAMUSCULAR; INTRAVENOUS; SUBCUTANEOUS at 12:30

## 2019-10-22 RX ADMIN — SODIUM CHLORIDE 100 MILLILITER(S): 9 INJECTION INTRAMUSCULAR; INTRAVENOUS; SUBCUTANEOUS at 21:37

## 2019-10-22 RX ADMIN — Medication 1 TABLET(S): at 18:30

## 2019-10-22 RX ADMIN — MEROPENEM 100 MILLIGRAM(S): 1 INJECTION INTRAVENOUS at 06:13

## 2019-10-22 RX ADMIN — MEROPENEM 100 MILLIGRAM(S): 1 INJECTION INTRAVENOUS at 14:43

## 2019-10-22 RX ADMIN — MEROPENEM 100 MILLIGRAM(S): 1 INJECTION INTRAVENOUS at 21:37

## 2019-10-22 NOTE — PROGRESS NOTE ADULT - ASSESSMENT
34F admitted for admitted for Sepsis secondary to Acute Pyelonephritis.     Pyelonephritis   IV Ceftriaxone but continues to have fevers; Consider changing Abx   Pain control PRN   ID on board and following  UC pending and follow     Diet  Regular    DVT Prophylaxis  Lovenox    Disposition  Full Code/Inpatient  Discharge planning pending hospital course

## 2019-10-22 NOTE — PHARMACY COMMUNICATION NOTE - COMMENTS
ASP: restricted antibiotic  CrCl = 100.9ml/min  Meropenem 1gm IVPB q8h  ID physician on case: Dr. RAMON Montenegro

## 2019-10-22 NOTE — PROGRESS NOTE ADULT - SUBJECTIVE AND OBJECTIVE BOX
Subjective: Continues to have fevers and right sided flank pain    MEDICATIONS  (STANDING):  enoxaparin Injectable 40 milliGRAM(s) SubCutaneous daily  lactobacillus acidophilus 1 Tablet(s) Oral two times a day  meropenem  IVPB 1000 milliGRAM(s) IV Intermittent every 8 hours  sodium chloride 0.9%. 1000 milliLiter(s) (100 mL/Hr) IV Continuous <Continuous>    MEDICATIONS  (PRN):  acetaminophen   Tablet .. 650 milliGRAM(s) Oral every 6 hours PRN Temp greater or equal to 38.5C (101.3F), Mild Pain (1 - 3)  oxyCODONE    5 mG/acetaminophen 325 mG 1 Tablet(s) Oral every 6 hours PRN Moderate Pain (4 - 6)      Allergies    penicillins (Pruritus; Rash)    Intolerances        Vital Signs Last 24 Hrs  T(C): 36.8 (23 Oct 2019 10:00), Max: 37.7 (22 Oct 2019 13:35)  T(F): 98.3 (23 Oct 2019 10:00), Max: 99.8 (22 Oct 2019 13:35)  HR: 73 (23 Oct 2019 10:00) (73 - 96)  BP: 131/89 (23 Oct 2019 10:00) (100/69 - 131/89)  BP(mean): --  RR: 18 (23 Oct 2019 10:00) (16 - 18)  SpO2: 97% (23 Oct 2019 10:00) (97% - 98%)    PHYSICAL EXAM:  GENERAL: NAD, well-groomed, well-developed  HEAD:  Atraumatic, Normocephalic  ENMT: Moist mucous membranes,   NECK: Supple, No JVD, Normal thyroid  NERVOUS SYSTEM:  All 4 extremities mobile, no gross sensory deficits.   CHEST/LUNG: Clear to auscultation bilaterally; No rales, rhonchi, wheezing, or rubs  HEART: Regular rate and rhythm; No murmurs, rubs, or gallops  ABDOMEN: Right Sided flank tenderness   EXTREMITIES:  2+ Peripheral Pulses, No clubbing, cyanosis, or edema      LABS:                        9.7    4.63  )-----------( 239      ( 23 Oct 2019 07:08 )             29.5     23 Oct 2019 07:08    140    |  105    |  3      ----------------------------<  114    3.2     |  27     |  0.59     Ca    8.2        23 Oct 2019 07:08        Urinalysis Basic - ( 22 Oct 2019 20:07 )    Color: Yellow / Appearance: Clear / S.010 / pH: x  Gluc: x / Ketone: Negative  / Bili: Negative / Urobili: Negative mg/dL   Blood: x / Protein: Negative mg/dL / Nitrite: Negative   Leuk Esterase: Negative / RBC: 11-25 /HPF / WBC 0-2   Sq Epi: x / Non Sq Epi: Few / Bacteria: Few      CAPILLARY BLOOD GLUCOSE          RADIOLOGY & ADDITIONAL TESTS:    Imaging Personally Reviewed:  [ ] YES     Consultant(s) Notes Reviewed:      Care Discussed with Consultants/Other Providers:    Advanced Directives: [ ] DNR  [ ] No feeding tube  [ ] MOLST in chart  [ ] MOLST completed today  [ ] Unknown

## 2019-10-22 NOTE — PROGRESS NOTE ADULT - SUBJECTIVE AND OBJECTIVE BOX
ID progress note    Name: TRISTON CARD  Age: 34y  Gender: Female  MRN: [id number]    Interval History-- Events noted, still febrile to 103 . So far all sepsis work up is negative , still with right flank pain . Denies any cough or chest pain  Notes reviewed    Past Medical History--  Proteinuria  History of D&C      For details regarding the patient's social history, family history, and other miscellaneous elements, please refer the initial infectious diseases consultation and/or the admitting history and physical examination for this admission.    Allergies--  Allergies    penicillins (Pruritus; Rash)    Intolerances        Medications--  Antibiotics:  meropenem  IVPB 1000 milliGRAM(s) IV Intermittent every 8 hours    Immunologic:    Other:  acetaminophen   Tablet .. PRN  enoxaparin Injectable  oxyCODONE    5 mG/acetaminophen 325 mG PRN  sodium chloride 0.9%.      Review of Systems--  A 10-point review of systems was obtained.     Pertinent positives and negatives--  Constitutional:  Pos for  fevers. and Chills. No Rigors.   Cardiovascular: No chest pain. No palpitations.  Respiratory: No shortness of breath. No cough.  Gastrointestinal: No nausea or vomiting. No diarrhea or constipation.   Psychiatric: Pleasant. Appropriate affect.    Review of systems otherwise negative except as previously noted.    Physical Examination--  Vital Signs: T(F): 100.4 (10-22-19 @ 09:15), Max: 103 (10-21-19 @ 15:10)  HR: 83 (10-22-19 @ 09:15)  BP: 118/84 (10-22-19 @ 09:15)  RR: 17 (10-22-19 @ 09:15)  SpO2: 95% (10-22-19 @ 09:15)  Wt(kg): --  General: Nontoxic-appearing Female in no acute distress.  HEENT: AT/NC. PERRL. EOMI. Anicteric. Conjunctiva pink and moist. Oropharynx clear. Dentition fair.  Neck: Not rigid. No sense of mass.  Nodes: None palpable.  Lungs: Clear bilaterally without rales, wheezing or rhonchi  Heart: Regular rate and rhythm. No Murmur. No rub. No gallop. No palpable thrill.  Abdomen: Bowel sounds present and normoactive. Soft. Nondistended. Nontender. No sense of mass. No organomegaly. Right CVA tenderness   Back: No spinal tenderness. No costovertebral angle tenderness.   Extremities: No cyanosis or clubbing. No edema.   Skin: Warm. Dry. Good turgor. No rash. No vasculitic stigmata.  Psychiatric: Appropriate affect and mood for situation.         Laboratory Studies--  CBC                        9.9    8.75  )-----------( 204      ( 22 Oct 2019 07:19 )             30.1       Chemistries  10-22    137  |  104  |  4<L>  ----------------------------<  107<H>  3.5   |  26  |  0.67    Ca    8.2<L>      22 Oct 2019 07:19  Mg     1.7     10-20    TPro  7.5  /  Alb  3.1<L>  /  TBili  0.8  /  DBili  x   /  AST  20  /  ALT  24  /  AlkPhos  73  10-20    Urinalysis Basic - ( 20 Oct 2019 15:08 )    Color: Yellow / Appearance: Clear / S.005 / pH: x  Gluc: x / Ketone: Moderate  / Bili: Negative / Urobili: Negative mg/dL   Blood: x / Protein: 30 mg/dL / Nitrite: Negative   Leuk Esterase: Trace / RBC: 25-50 /HPF / WBC 3-5   Sq Epi: x / Non Sq Epi: Few / Bacteria: Few    Lactate, Blood: 1.1 mmol/L (10-21-19 @ 07:19)  Lactate, Blood: 1.5 mmol/L (10-20-19 @ 13:39)  Lactate, Blood: 2.9 mmol/L (10-20-19 @ 11:18)          Culture Data    Culture - Blood (collected 20 Oct 2019 20:20)  Source: .Blood  Preliminary Report (21 Oct 2019 21:00):    No growth to date.    Culture - Blood (collected 20 Oct 2019 20:20)  Source: .Blood  Preliminary Report (21 Oct 2019 21:00):    No growth to date.    Culture - Urine (collected 20 Oct 2019 20:05)  Source: .Urine  Final Report (21 Oct 2019 16:27):    <10,000 CFU/mL Normal Urogenital Alis            RADIOLOGY:  Xray Chest 2 Views PA/Lat (10.20.19 @ 13:06) >    FINDINGS:    Lungs: The lungs are clear.  Heart: The heart is normal in size.  Mediastinum: The mediastinum is within normal limits.    IMPRESSION:    Clear lungs.    CT Renal Stone Hunt (10.20.19 @ 12:12) >  FINDINGS:    LOWER CHEST: Within normal limits.    LIVER: Within normal limits.  BILE DUCTS: Normal caliber.  GALLBLADDER: Within normal limits.  SPLEEN: Within normal limits.  PANCREAS: Within normal limits.  ADRENALS: Within normal limits.  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   No hydroureteronephrosis or obstructing ureteral calculus is noted.  There is a small, subcentimeter fatty lesion interpolar right kidney   likely reflecting angiomyolipoma.    There is minimal right perinephric stranding with mild stranding   extending along the posterior flank.    BLADDER: Within normal limits.  REPRODUCTIVE ORGANS: Uterus and adnexa unremarkable as visualized.    BOWEL: Evaluation of the stomach and gastrointestinal tract is limited   without distention.    No bowel obstruction is noted.   Appendix normal appearing.  PERITONEUM: No ascites.  VESSELS:   There are punctate calcifications within the periportal and   peripancreatic region, which may be related to vasculature.  There is mild haziness surrounding the proximal extent of the superior   mesenteric artery.  The abdominal aorta is normal in caliber.  RETROPERITONEUM/LYMPH NODES: No lymphadenopathy.    ABDOMINAL WALL: Within normal limits.  BONES: Within normal limits.    IMPRESSION:     Haziness surrounding the superior mesenteric artery with small vascular   calcifications in the right upper quadrant/ periportal region. Recommend   further clinical correlation for a vasculitis.    Mild right perinephric stranding, correlate clinically for urinary tract   infection.  No CT evidence for obstructive uropathy.    Other findings as discussed.    Assessment :   35yo female with no significant PMH presents with fever, chills and diffuse body aches since Friday worse today associated with dysuria , has right flank pain, CT stone study suspects right pyelonephritis , has allergy to PCN but tolerated Rocephin     She had repeat ct chest/abd and pelvis with IV and oral contrast official report pending  but as per radiology  has perinephric stranding right kidney c/w pyelonephritis . Also shows pericholecystic fluid and RUL nodule  Plan :   - continue with Meropenem , changed as was still febrile on Rocephin   - send sed ate, CRP and PCT  - get abdominal sonogram  - consider urology evaluation if hematuria persists   - expect patient to have bacteremia based on her presentation     Continue with present regime .  Appropriate use of antibiotics and adverse effects reviewed.    I have discussed the above plan of care with patient/family in detail. They expressed understanding of the treatment plan . Risks, benefits and alternatives discussed in detail. I have asked if they have any questions or concerns and appropriately addressed them to the best of my ability .      > 35 minutes spent in direct patient care reviewing  the notes, lab data/ imaging , discussion with multidisciplinary team. All questions were addressed and answered to the best of my capacity .    Thank you for allowing me to participate in the care of your patient .        Leander Montenegro MD  [phone]

## 2019-10-23 LAB
ANION GAP SERPL CALC-SCNC: 8 MMOL/L — SIGNIFICANT CHANGE UP (ref 5–17)
BASOPHILS # BLD AUTO: 0.01 K/UL — SIGNIFICANT CHANGE UP (ref 0–0.2)
BASOPHILS NFR BLD AUTO: 0.2 % — SIGNIFICANT CHANGE UP (ref 0–2)
BUN SERPL-MCNC: 3 MG/DL — LOW (ref 7–23)
CALCIUM SERPL-MCNC: 8.2 MG/DL — LOW (ref 8.4–10.5)
CHLORIDE SERPL-SCNC: 105 MMOL/L — SIGNIFICANT CHANGE UP (ref 96–108)
CO2 SERPL-SCNC: 27 MMOL/L — SIGNIFICANT CHANGE UP (ref 22–31)
CREAT SERPL-MCNC: 0.59 MG/DL — SIGNIFICANT CHANGE UP (ref 0.5–1.3)
EOSINOPHIL # BLD AUTO: 0.06 K/UL — SIGNIFICANT CHANGE UP (ref 0–0.5)
EOSINOPHIL NFR BLD AUTO: 1.3 % — SIGNIFICANT CHANGE UP (ref 0–6)
GLUCOSE SERPL-MCNC: 114 MG/DL — HIGH (ref 70–99)
HCT VFR BLD CALC: 29.5 % — LOW (ref 34.5–45)
HGB BLD-MCNC: 9.7 G/DL — LOW (ref 11.5–15.5)
IMM GRANULOCYTES NFR BLD AUTO: 0.2 % — SIGNIFICANT CHANGE UP (ref 0–1.5)
LYMPHOCYTES # BLD AUTO: 0.97 K/UL — LOW (ref 1–3.3)
LYMPHOCYTES # BLD AUTO: 21 % — SIGNIFICANT CHANGE UP (ref 13–44)
MCHC RBC-ENTMCNC: 29.3 PG — SIGNIFICANT CHANGE UP (ref 27–34)
MCHC RBC-ENTMCNC: 32.9 GM/DL — SIGNIFICANT CHANGE UP (ref 32–36)
MCV RBC AUTO: 89.1 FL — SIGNIFICANT CHANGE UP (ref 80–100)
MONOCYTES # BLD AUTO: 0.56 K/UL — SIGNIFICANT CHANGE UP (ref 0–0.9)
MONOCYTES NFR BLD AUTO: 12.1 % — SIGNIFICANT CHANGE UP (ref 2–14)
NEUTROPHILS # BLD AUTO: 3.02 K/UL — SIGNIFICANT CHANGE UP (ref 1.8–7.4)
NEUTROPHILS NFR BLD AUTO: 65.2 % — SIGNIFICANT CHANGE UP (ref 43–77)
NRBC # BLD: 0 /100 WBCS — SIGNIFICANT CHANGE UP (ref 0–0)
PLATELET # BLD AUTO: 239 K/UL — SIGNIFICANT CHANGE UP (ref 150–400)
POTASSIUM SERPL-MCNC: 3.2 MMOL/L — LOW (ref 3.5–5.3)
POTASSIUM SERPL-SCNC: 3.2 MMOL/L — LOW (ref 3.5–5.3)
RBC # BLD: 3.31 M/UL — LOW (ref 3.8–5.2)
RBC # FLD: 12.7 % — SIGNIFICANT CHANGE UP (ref 10.3–14.5)
SODIUM SERPL-SCNC: 140 MMOL/L — SIGNIFICANT CHANGE UP (ref 135–145)
WBC # BLD: 4.63 K/UL — SIGNIFICANT CHANGE UP (ref 3.8–10.5)
WBC # FLD AUTO: 4.63 K/UL — SIGNIFICANT CHANGE UP (ref 3.8–10.5)

## 2019-10-23 PROCEDURE — 99232 SBSQ HOSP IP/OBS MODERATE 35: CPT

## 2019-10-23 PROCEDURE — 78226 HEPATOBILIARY SYSTEM IMAGING: CPT | Mod: 26

## 2019-10-23 RX ORDER — POTASSIUM CHLORIDE 20 MEQ
40 PACKET (EA) ORAL EVERY 4 HOURS
Refills: 0 | Status: COMPLETED | OUTPATIENT
Start: 2019-10-23 | End: 2019-10-23

## 2019-10-23 RX ADMIN — MEROPENEM 100 MILLIGRAM(S): 1 INJECTION INTRAVENOUS at 14:38

## 2019-10-23 RX ADMIN — Medication 1 TABLET(S): at 18:20

## 2019-10-23 RX ADMIN — Medication 40 MILLIEQUIVALENT(S): at 18:20

## 2019-10-23 RX ADMIN — SODIUM CHLORIDE 100 MILLILITER(S): 9 INJECTION INTRAMUSCULAR; INTRAVENOUS; SUBCUTANEOUS at 18:20

## 2019-10-23 RX ADMIN — MEROPENEM 100 MILLIGRAM(S): 1 INJECTION INTRAVENOUS at 05:29

## 2019-10-23 RX ADMIN — Medication 40 MILLIEQUIVALENT(S): at 14:38

## 2019-10-23 RX ADMIN — Medication 1 TABLET(S): at 05:29

## 2019-10-23 RX ADMIN — MEROPENEM 100 MILLIGRAM(S): 1 INJECTION INTRAVENOUS at 22:17

## 2019-10-23 NOTE — CONSULT NOTE ADULT - REASON FOR ADMISSION
back pain since last Friday.
Fever  Sepsis due to Pyelonephritis  UTI  Hypokalemia
back pain since last Friday.

## 2019-10-23 NOTE — CONSULT NOTE ADULT - SUBJECTIVE AND OBJECTIVE BOX
Patient is a 34y old  Female who presents with a chief complaint of back pain since last Friday. (23 Oct 2019 11:08)    HPI:  pt is a 33yo female with no significant pmhx presents with fever, chills and diffuse body aches since Friday worse today. pt reports right flank pain without radiation with associated nausea. pt did not take anything for symptoms. pt denies cp, sob, v/d, dysuria. (20 Oct 2019 14:58)    Renal consult called for ? Pyelonephritis, fever. ID work up in progress.   Pt states she feels better.       PAST MEDICAL HISTORY:  Proteinuria      PAST SURGICAL HISTORY:  History of D&C      FAMILY HISTORY:  No pertinent family history in first degree relatives      SOCIAL HISTORY: No smoking or alcohol use     Allergies    penicillins (Pruritus; Rash)    Intolerances      Home Medications:  aspirin 325 mg oral tablet: 1 dose(s) orally 3 times a day, As Needed (20 Oct 2019 14:54)  Tylenol 500 mg oral tablet: 1 dose(s) orally 3 times a day (20 Oct 2019 14:54)    MEDICATIONS  (STANDING):  enoxaparin Injectable 40 milliGRAM(s) SubCutaneous daily  lactobacillus acidophilus 1 Tablet(s) Oral two times a day  meropenem  IVPB 1000 milliGRAM(s) IV Intermittent every 8 hours  potassium chloride    Tablet ER 40 milliEquivalent(s) Oral every 4 hours  sodium chloride 0.9%. 1000 milliLiter(s) (100 mL/Hr) IV Continuous <Continuous>    MEDICATIONS  (PRN):  acetaminophen   Tablet .. 650 milliGRAM(s) Oral every 6 hours PRN Temp greater or equal to 38.5C (101.3F), Mild Pain (1 - 3)  oxyCODONE    5 mG/acetaminophen 325 mG 1 Tablet(s) Oral every 6 hours PRN Moderate Pain (4 - 6)      REVIEW OF SYSTEMS:  General: NAD  Respiratory: No cough, SOB  Cardiovascular: No CP or Palpitations	  Gastrointestinal: No nausea, Vomiting. No diarrhea  Genitourinary: No urinary complaints, Rt flank pain better	  Musculoskeletal: No leg swelling, No new rash or lesions	  all other systems negative    T(F): 98.3 (10-23-19 @ 10:00), Max: 99.7 (10-22-19 @ 15:40)  HR: 73 (10-23-19 @ 10:00) (73 - 93)  BP: 131/89 (10-23-19 @ 10:00) (100/69 - 131/89)  RR: 18 (10-23-19 @ 10:00) (16 - 18)  SpO2: 97% (10-23-19 @ 10:00) (97% - 97%)  Wt(kg): --    PHYSICAL EXAM:  General: NAD  Respiratory: b/l air entry  Cardiovascular: S1 S2  Gastrointestinal: soft  Extremities: no edema        10-23    140  |  105  |  3<L>  ----------------------------<  114<H>  3.2<L>   |  27  |  0.59    Ca    8.2<L>      23 Oct 2019 07:08                            9.7    4.63  )-----------( 239      ( 23 Oct 2019 07:08 )             29.5       Hematocrit: 29.5 % (10-23 @ 07:08)  Hemoglobin: 9.7 g/dL (10-23 @ 07:08)  Calcium, Total Serum: 8.2 mg/dL (10-23 @ 07:08)  Blood Urea Nitrogen, Serum: 3 mg/dL (10-23 @ 07:08)      Creatinine, Serum: 0.59 (10-23 @ 07:08)  Creatinine, Serum: 0.67 (10-22 @ 07:19)  Creatinine, Serum: 0.58 (10-21 @ 07:19)      Urinalysis Basic - ( 22 Oct 2019 20:07 )    Color: Yellow / Appearance: Clear / S.010 / pH: x  Gluc: x / Ketone: Negative  / Bili: Negative / Urobili: Negative mg/dL   Blood: x / Protein: Negative mg/dL / Nitrite: Negative   Leuk Esterase: Negative / RBC: 11-25 /HPF / WBC 0-2   Sq Epi: x / Non Sq Epi: Few / Bacteria: Few      I&O's Detail    22 Oct 2019 07:01  -  23 Oct 2019 07:00  --------------------------------------------------------  IN:    IV PiggyBack: 50 mL    Oral Fluid: 240 mL    sodium chloride 0.9%.: 1100 mL  Total IN: 1390 mL    OUT:    Voided: 900 mL  Total OUT: 900 mL    Total NET: 490 mL    Culture - Blood (collected 20 Oct 2019 20:20)  Source: .Blood  Preliminary Report (21 Oct 2019 21:00):    No growth to date.    Culture - Blood (collected 20 Oct 2019 20:20)  Source: .Blood  Preliminary Report (21 Oct 2019 21:00):    No growth to date.    Culture - Urine (collected 20 Oct 2019 20:05)  Source: .Urine  Final Report (21 Oct 2019 16:27):    <10,000 CFU/mL Normal Urogenital Alis    < from: US Abdomen Complete (10.22.19 @ 14:25) >    EXAM:  US ABDOMEN COMPLETE                                  PROCEDURE DATE:  10/22/2019          INTERPRETATION:  History: Pericholecystic fluid on CT of the same day.    Abdominal ultrasound.    There is a small 4 mm gallbladder polyp. Diffuse gallbladder wall   thickening and small amount of pericholecystic fluid is nonspecific could   be reactive. If there is clinical suspicion for cholecystitis recommend   HIDA scan. No biliary dilatation. Common duct 0.3 cm. Liver unremarkable.   Pancreas unremarkable. Spleen not enlarged.  Right kidney 11.8 the left 13.9 cm. No hydronephrosis or shadowing renal   calculi. There is a 6 mm angiomyolipoma right kidney lower pole.  No hydronephrosis.    Impression:    Small gallbladder polyp, wall thickening pericholecystic fluid. See   discussion above.  Additional findings as discussed      CASIE ALDRIDGE M.D., ATTENDING RADIOLOGIST  This document has been electronically signed. Oct 22 2019  2:47PM          < end of copied text >    < from: CT Chest w/ Oral Cont and w/ IV Cont (10.22.19 @ 09:13) >    EXAM:  CT CHEST OC IC                          PROCEDURE DATE:  10/22/2019      INTERPRETATION:   Fever.    CT chest abdomen and pelvis only IV contrast.  90 cc Omnipaque 350 injected intravenously.  There is a tree-in-bud parenchymal infiltrate in the right upper lobe   suggesting an inflammatory or infectious bronchiolitis. There is an   indeterminate noncalcified 9 mm parenchymal nodule right upper lobe.   Recommend comparison with previous studies if available and if necessary,   CT surveillance in 3 months  Small layering bibasilar pleural effusions with dependent atelectasis.  Central airways patent. No mediastinal adenopathy. Normal caliber   thoracic aorta.  Normal heart size. No pericardial effusion.  No calcified gallstones or biliary dilatation. Small amount   pericholecystic fluid. This is nonspecific could be reactive. However if   there is clinical concern for potential cholecystitis right upper   quadrant ultrasound can be obtained.  Pancreas spleen adrenals not remarkable.  The right kidney is enlarged demonstrates slightly heterogeneous   nephrographic response with perinephric stranding and trace perirenal   fluid. Findings consistent with acute pyelonephritis. There is no vivek   abscess formation. Bilateral tiny renal cortical hypodensities too small   to characterize probable cysts. No hydronephrosis or urolithiasis   bilaterally.  Nonaneurysmal abdominal aorta.  Normal appendix. No inflamed or obstructed bowel.  Trace pelvic ascites. No extraluminal gas or organized collections.  Uterus adnexa bladder not remarkable  No acute or aggressive osseous pathology.    Impression:    Small tree-in-bud right upper lobe infiltrate suggesting an   inflammatory/infectious bronchiolitis.  Indeterminate 9mm parenchymal nodule right upper lobe as described.  Small bilateral pleural effusions with dependent atelectasis.  Nonspecific pericholecystic fluid. Correlate with upper quadrant   ultrasound as clinically indicated.  Acute right pyelonephritis. No vivek abscess formation at this time. No   hydronephrosis  Additional findings as discussed    Discussed with Dr. zarina Montenegro prior to this dictation      CASIE ALDRIDGE M.D., ATTENDING RADIOLOGIST  This document has been electronically signed. Oct 22 2019 10:42AM            < end of copied text >

## 2019-10-23 NOTE — CONSULT NOTE ADULT - ASSESSMENT
1.	? Pyelonephritis, Fever: UA unremarkable, ? Hematogenous spread but blood c/s negative so far  2.	? H/o proteinuria    Will get random urine p/c. Check serologies. Pt has clinically improved with IV abx. ID evaluation in progress.   Will follow electrolytes and renal function trend. D/w family at bedside. Further recommendations pending clinical course.   Thank you for the courtesy of this referral.

## 2019-10-23 NOTE — PROGRESS NOTE ADULT - SUBJECTIVE AND OBJECTIVE BOX
ID progress note     Name: TRISTON CARD  Age: 34y  Gender: Female  MRN: [id number]    Interval History-- Events noted, low grade fevers, still with right flank pain  , has hematuria . CT results noted, had RUQ sonogram, shows mild GB wall thickening and GB polyp, So far sepsis work up is negative  . Low grade fevers   Notes reviewed    Past Medical History--  Proteinuria  History of D&C      For details regarding the patient's social history, family history, and other miscellaneous elements, please refer the initial infectious diseases consultation and/or the admitting history and physical examination for this admission.    Allergies--  Allergies    penicillins (Pruritus; Rash)    Intolerances        Medications--  Antibiotics:  meropenem  IVPB 1000 milliGRAM(s) IV Intermittent every 8 hours    Immunologic:    Other:  acetaminophen   Tablet .. PRN  enoxaparin Injectable  lactobacillus acidophilus  oxyCODONE    5 mG/acetaminophen 325 mG PRN  sodium chloride 0.9%.      Review of Systems--  A 10-point review of systems was obtained.     Pertinent positives and negatives--  Constitutional: No fevers. No Chills. No Rigors.   Cardiovascular: No chest pain. No palpitations.  Respiratory: No shortness of breath. No cough.  Gastrointestinal: No nausea or vomiting. No diarrhea or constipation.   Psychiatric: Pleasant. Appropriate affect.    Review of systems otherwise negative except as previously noted.    Physical Examination--  Vital Signs: T(F): 98.5 (10-23-19 @ 05:25), Max: 99.8 (10-22-19 @ 13:35)  HR: 75 (10-23-19 @ 05:25)  BP: 120/83 (10-23-19 @ 05:25)  RR: 18 (10-23-19 @ 05:25)  SpO2: 97% (10-23-19 @ 05:25)  Wt(kg): --  General: Nontoxic-appearing Female in no acute distress.  HEENT: AT/NC. PERRL. EOMI. Anicteric. Conjunctiva pink and moist. Oropharynx clear. Dentition fair.  Neck: Not rigid. No sense of mass.  Nodes: None palpable.  Lungs: Clear bilaterally without rales, wheezing or rhonchi  Heart: Regular rate and rhythm. No Murmur. No rub. No gallop. No palpable thrill.  Abdomen: Bowel sounds present and normoactive. Soft. Nondistended. mild ruq tenderness . No sense of mass. No organomegaly.  Back: No spinal tenderness. No costovertebral angle tenderness.   Extremities: No cyanosis or clubbing. No edema.   Skin: Warm. Dry. Good turgor. No rash. No vasculitic stigmata.  Psychiatric: Appropriate affect and mood for situation.     Laboratory Studies--  CBC                        9.7    4.63  )-----------( 239      ( 23 Oct 2019 07:08 )             29.5       Chemistries  10-23    140  |  105  |  3<L>  ----------------------------<  114<H>  3.2<L>   |  27  |  0.59    Ca    8.2<L>      23 Oct 2019 07:08    Urinalysis Basic - ( 22 Oct 2019 20:07 )    Color: Yellow / Appearance: Clear / S.010 / pH: x  Gluc: x / Ketone: Negative  / Bili: Negative / Urobili: Negative mg/dL   Blood: x / Protein: Negative mg/dL / Nitrite: Negative   Leuk Esterase: Negative / RBC: 11-25 /HPF / WBC 0-2   Sq Epi: x / Non Sq Epi: Few / Bacteria: Few    Sedimentation Rate, Erythrocyte (10.22.19 @ 07:19)    Sedimentation Rate, Erythrocyte: 71 mm/Hr    C-Reactive Protein, Serum (10.22.19 @ 15:53)    C-Reactive Protein, Serum: 15.07 mg/dL    Procalcitonin, Serum (10.. @ 15:53)    Procalcitonin, Serum: 0.60:       Culture Data    Culture - Blood (collected 20 Oct 2019 20:20)  Source: .Blood  Preliminary Report (21 Oct 2019 21:00):    No growth to date.    Culture - Blood (collected 20 Oct 2019 20:20)  Source: .Blood  Preliminary Report (21 Oct 2019 21:00):    No growth to date.    Culture - Urine (collected 20 Oct 2019 20:05)  Source: .Urine  Final Report (21 Oct 2019 16:27):    <10,000 CFU/mL Normal Urogenital Alis      RADIOLOGY:  US Abdomen Complete (10.22.19 @ 14:25) >  There is a small 4 mm gallbladder polyp. Diffuse gallbladder wall   thickening and small amount of pericholecystic fluid is nonspecific could   be reactive. If there is clinical suspicion for cholecystitis recommend   HIDA scan. No biliary dilatation. Common duct 0.3 cm. Liver unremarkable.   Pancreas unremarkable. Spleen not enlarged.  Right kidney 11.8 the left 13.9 cm. No hydronephrosis or shadowing renal   calculi. There is a 6 mm angiomyolipoma right kidney lower pole.  No hydronephrosis.    Impression:    Small gallbladder polyp, wall thickening pericholecystic fluid. See   discussion above.  Additional findings as discussed    CT Chest w/ Oral Cont and w/ IV Cont (10.22.19 @ 09:13) >  CT chest abdomen and pelvis only IV contrast.  90 cc Omnipaque 350 injected intravenously.  There is a tree-in-bud parenchymal infiltrate in the right upper lobe   suggesting an inflammatory or infectious bronchiolitis. There is an   indeterminate noncalcified 9 mm parenchymal nodule right upper lobe.   Recommend comparison with previous studies if available and if necessary,   CT surveillance in 3 months  Small layering bibasilar pleural effusions with dependent atelectasis.  Central airways patent. No mediastinal adenopathy. Normal caliber   thoracic aorta.  Normal heart size. No pericardial effusion.  No calcified gallstones or biliary dilatation. Small amount   pericholecystic fluid. This is nonspecific could be reactive. However if   there is clinical concern for potential cholecystitis right upper   quadrant ultrasound can be obtained.  Pancreas spleen adrenals not remarkable.  The right kidney is enlarged demonstrates slightly heterogeneous   nephrographic response with perinephric stranding and trace perirenal   fluid. Findings consistent with acute pyelonephritis. There is no vivek   abscess formation. Bilateral tiny renal cortical hypo densities too small   to characterize probable cysts. No hydronephrosis or urolithiasis   bilaterally.  Nonaneurysmal abdominal aorta.  Normal appendix. No inflamed or obstructed bowel.  Trace pelvic ascites. No extraluminal gas or organized collections.  Uterus adnexa bladder not remarkable  No acute or aggressive osseous pathology.    Impression:    Small tree-in-bud right upper lobe infiltrate suggesting an   inflammatory/infectious bronchiolitis.  Indeterminate 9mm parenchymal nodule right upper lobe as described.  Small bilateral pleural effusions with dependent atelectasis.  Nonspecific pericholecystic fluid. Correlate with upper quadrant   ultrasound as clinically indicated.  Acute right pyelonephritis. No vivek abscess formation at this time. No   hydronephrosis  Additional findings as discussed      Assessment--  33yo female with no significant PMH presents with fever, chills and diffuse body aches since Friday worse today associated with dysuria , has right flank pain, CT stone study suspects right pyelonephritis , has allergy to PCN but tolerated Rocephin     She had repeat ct chest/abd and pelvis with IV and oral contrast official report pending  but as per radiology  has perinephric stranding right kidney c/w pyelonephritis . Also shows pericholecystic fluid and RUL nodule    She has a hx of proteinuria by her H & P . She has elevated sed rate and CRP . She could have underlying vasculitis or  auto immune disease causing nephritis     Plan:  - continue with Meropenem , changed as was still febrile on Rocephin   - consider nephrology and urology consults   - get HIDA scan as recommended by radiology   - expect patient to have bacteremia based on her presentation     Continue with present regime .  Appropriate use of antibiotics and adverse effects reviewed.    I have discussed the above plan of care with patient in detail. She expressed understanding of the treatment plan . Risks, benefits and alternatives discussed in detail. I have asked if they have any questions or concerns and appropriately addressed them to the best of my ability.    > 35 minutes spent in direct patient care reviewing  the notes, lab data/ imaging , discussion with multidisciplinary team. All questions were addressed and answered to the best of my capacity .    Thank you for allowing me to participate in the care of your patient .        Leander Montenegro MD  [phone]

## 2019-10-23 NOTE — PROGRESS NOTE ADULT - ASSESSMENT
34F admitted for admitted for Sepsis secondary to Acute Pyelonephritis.     Pyelonephritis   Abx broadened to Meropenam but Urine Cultures remain negative so not consistent with Pyelonephritis  Large Blood along with Proteinuria; Should have Nephrology and Vascular workup  Repeat CT and US Abdomen shows Pyelonephritis along with Pericholecystic Fluid; Will obtain HIDA scan   Pain control PRN   ID on board and following  UC pending and follow     Diet  Regular  NPO after midnight for HIDA tomorrow     DVT Prophylaxis  Lovenox    Disposition  Full Code/Inpatient  Discharge planning pending hospital course

## 2019-10-23 NOTE — PROGRESS NOTE ADULT - SUBJECTIVE AND OBJECTIVE BOX
Subjective: Patient continues to have fevers.     MEDICATIONS  (STANDING):  enoxaparin Injectable 40 milliGRAM(s) SubCutaneous daily  lactobacillus acidophilus 1 Tablet(s) Oral two times a day  meropenem  IVPB 1000 milliGRAM(s) IV Intermittent every 8 hours  sodium chloride 0.9%. 1000 milliLiter(s) (100 mL/Hr) IV Continuous <Continuous>    MEDICATIONS  (PRN):  acetaminophen   Tablet .. 650 milliGRAM(s) Oral every 6 hours PRN Temp greater or equal to 38.5C (101.3F), Mild Pain (1 - 3)  oxyCODONE    5 mG/acetaminophen 325 mG 1 Tablet(s) Oral every 6 hours PRN Moderate Pain (4 - 6)      Allergies    penicillins (Pruritus; Rash)    Intolerances        Vital Signs Last 24 Hrs  T(C): 36.8 (23 Oct 2019 10:00), Max: 37.7 (22 Oct 2019 13:35)  T(F): 98.3 (23 Oct 2019 10:00), Max: 99.8 (22 Oct 2019 13:35)  HR: 73 (23 Oct 2019 10:00) (73 - 96)  BP: 131/89 (23 Oct 2019 10:00) (100/69 - 131/89)  BP(mean): --  RR: 18 (23 Oct 2019 10:00) (16 - 18)  SpO2: 97% (23 Oct 2019 10:00) (97% - 98%)    PHYSICAL EXAM:  GENERAL: NAD, well-groomed, well-developed  HEAD:  Atraumatic, Normocephalic  ENMT: Moist mucous membranes,   NECK: Supple, No JVD, Normal thyroid  NERVOUS SYSTEM:  All 4 extremities mobile, no gross sensory deficits.   CHEST/LUNG: Clear to auscultation bilaterally; No rales, rhonchi, wheezing, or rubs  HEART: Regular rate and rhythm; No murmurs, rubs, or gallops  ABDOMEN: Right CVA tenderness   EXTREMITIES:  2+ Peripheral Pulses, No clubbing, cyanosis, or edema      LABS:                        9.7    4.63  )-----------( 239      ( 23 Oct 2019 07:08 )             29.5     23 Oct 2019 07:08    140    |  105    |  3      ----------------------------<  114    3.2     |  27     |  0.59     Ca    8.2        23 Oct 2019 07:08        Urinalysis Basic - ( 22 Oct 2019 20:07 )    Color: Yellow / Appearance: Clear / S.010 / pH: x  Gluc: x / Ketone: Negative  / Bili: Negative / Urobili: Negative mg/dL   Blood: x / Protein: Negative mg/dL / Nitrite: Negative   Leuk Esterase: Negative / RBC: 11-25 /HPF / WBC 0-2   Sq Epi: x / Non Sq Epi: Few / Bacteria: Few      CAPILLARY BLOOD GLUCOSE          RADIOLOGY & ADDITIONAL TESTS:    Imaging Personally Reviewed:  [ ] YES     Consultant(s) Notes Reviewed:      Care Discussed with Consultants/Other Providers:    Advanced Directives: [ ] DNR  [ ] No feeding tube  [ ] MOLST in chart  [ ] MOLST completed today  [ ] Unknown

## 2019-10-24 ENCOUNTER — TRANSCRIPTION ENCOUNTER (OUTPATIENT)
Age: 34
End: 2019-10-24

## 2019-10-24 VITALS
DIASTOLIC BLOOD PRESSURE: 76 MMHG | OXYGEN SATURATION: 98 % | RESPIRATION RATE: 18 BRPM | SYSTOLIC BLOOD PRESSURE: 112 MMHG | TEMPERATURE: 98 F | HEART RATE: 76 BPM

## 2019-10-24 LAB
ANION GAP SERPL CALC-SCNC: 6 MMOL/L — SIGNIFICANT CHANGE UP (ref 5–17)
BUN SERPL-MCNC: 4 MG/DL — LOW (ref 7–23)
C3 SERPL-MCNC: 129 MG/DL — SIGNIFICANT CHANGE UP (ref 81–157)
C4 SERPL-MCNC: 36 MG/DL — SIGNIFICANT CHANGE UP (ref 13–39)
CALCIUM SERPL-MCNC: 8.5 MG/DL — SIGNIFICANT CHANGE UP (ref 8.4–10.5)
CHLORIDE SERPL-SCNC: 104 MMOL/L — SIGNIFICANT CHANGE UP (ref 96–108)
CO2 SERPL-SCNC: 27 MMOL/L — SIGNIFICANT CHANGE UP (ref 22–31)
CREAT ?TM UR-MCNC: 27 MG/DL — SIGNIFICANT CHANGE UP
CREAT SERPL-MCNC: 0.66 MG/DL — SIGNIFICANT CHANGE UP (ref 0.5–1.3)
GLUCOSE SERPL-MCNC: 121 MG/DL — HIGH (ref 70–99)
HCT VFR BLD CALC: 31.1 % — LOW (ref 34.5–45)
HGB BLD-MCNC: 10.1 G/DL — LOW (ref 11.5–15.5)
MCHC RBC-ENTMCNC: 29.1 PG — SIGNIFICANT CHANGE UP (ref 27–34)
MCHC RBC-ENTMCNC: 32.5 GM/DL — SIGNIFICANT CHANGE UP (ref 32–36)
MCV RBC AUTO: 89.6 FL — SIGNIFICANT CHANGE UP (ref 80–100)
NRBC # BLD: 0 /100 WBCS — SIGNIFICANT CHANGE UP (ref 0–0)
PLATELET # BLD AUTO: 278 K/UL — SIGNIFICANT CHANGE UP (ref 150–400)
POTASSIUM SERPL-MCNC: 3.6 MMOL/L — SIGNIFICANT CHANGE UP (ref 3.5–5.3)
POTASSIUM SERPL-SCNC: 3.6 MMOL/L — SIGNIFICANT CHANGE UP (ref 3.5–5.3)
PROT ?TM UR-MCNC: 7 MG/DL — SIGNIFICANT CHANGE UP (ref 0–12)
PROT/CREAT UR-RTO: 0.3 RATIO — HIGH (ref 0–0.2)
RBC # BLD: 3.47 M/UL — LOW (ref 3.8–5.2)
RBC # FLD: 12.6 % — SIGNIFICANT CHANGE UP (ref 10.3–14.5)
SODIUM SERPL-SCNC: 137 MMOL/L — SIGNIFICANT CHANGE UP (ref 135–145)
WBC # BLD: 3.71 K/UL — LOW (ref 3.8–10.5)
WBC # FLD AUTO: 3.71 K/UL — LOW (ref 3.8–10.5)

## 2019-10-24 PROCEDURE — 84156 ASSAY OF PROTEIN URINE: CPT

## 2019-10-24 PROCEDURE — 84145 PROCALCITONIN (PCT): CPT

## 2019-10-24 PROCEDURE — 96361 HYDRATE IV INFUSION ADD-ON: CPT

## 2019-10-24 PROCEDURE — 82570 ASSAY OF URINE CREATININE: CPT

## 2019-10-24 PROCEDURE — 99285 EMERGENCY DEPT VISIT HI MDM: CPT | Mod: 25

## 2019-10-24 PROCEDURE — 71046 X-RAY EXAM CHEST 2 VIEWS: CPT

## 2019-10-24 PROCEDURE — 87631 RESP VIRUS 3-5 TARGETS: CPT

## 2019-10-24 PROCEDURE — 83516 IMMUNOASSAY NONANTIBODY: CPT

## 2019-10-24 PROCEDURE — 84702 CHORIONIC GONADOTROPIN TEST: CPT

## 2019-10-24 PROCEDURE — 83605 ASSAY OF LACTIC ACID: CPT

## 2019-10-24 PROCEDURE — 74177 CT ABD & PELVIS W/CONTRAST: CPT

## 2019-10-24 PROCEDURE — 96365 THER/PROPH/DIAG IV INF INIT: CPT

## 2019-10-24 PROCEDURE — 86160 COMPLEMENT ANTIGEN: CPT

## 2019-10-24 PROCEDURE — 76700 US EXAM ABDOM COMPLETE: CPT

## 2019-10-24 PROCEDURE — 86140 C-REACTIVE PROTEIN: CPT

## 2019-10-24 PROCEDURE — 81001 URINALYSIS AUTO W/SCOPE: CPT

## 2019-10-24 PROCEDURE — A9537: CPT

## 2019-10-24 PROCEDURE — 80053 COMPREHEN METABOLIC PANEL: CPT

## 2019-10-24 PROCEDURE — 99239 HOSP IP/OBS DSCHRG MGMT >30: CPT

## 2019-10-24 PROCEDURE — 87040 BLOOD CULTURE FOR BACTERIA: CPT

## 2019-10-24 PROCEDURE — 74176 CT ABD & PELVIS W/O CONTRAST: CPT

## 2019-10-24 PROCEDURE — 83036 HEMOGLOBIN GLYCOSYLATED A1C: CPT

## 2019-10-24 PROCEDURE — 80048 BASIC METABOLIC PNL TOTAL CA: CPT

## 2019-10-24 PROCEDURE — 93005 ELECTROCARDIOGRAM TRACING: CPT

## 2019-10-24 PROCEDURE — 85610 PROTHROMBIN TIME: CPT

## 2019-10-24 PROCEDURE — 36415 COLL VENOUS BLD VENIPUNCTURE: CPT

## 2019-10-24 PROCEDURE — 78226 HEPATOBILIARY SYSTEM IMAGING: CPT

## 2019-10-24 PROCEDURE — 87086 URINE CULTURE/COLONY COUNT: CPT

## 2019-10-24 PROCEDURE — 71260 CT THORAX DX C+: CPT

## 2019-10-24 PROCEDURE — 83735 ASSAY OF MAGNESIUM: CPT

## 2019-10-24 PROCEDURE — 86036 ANCA SCREEN EACH ANTIBODY: CPT

## 2019-10-24 PROCEDURE — 85027 COMPLETE CBC AUTOMATED: CPT

## 2019-10-24 PROCEDURE — 86038 ANTINUCLEAR ANTIBODIES: CPT

## 2019-10-24 PROCEDURE — 85730 THROMBOPLASTIN TIME PARTIAL: CPT

## 2019-10-24 PROCEDURE — 85652 RBC SED RATE AUTOMATED: CPT

## 2019-10-24 RX ORDER — CIPROFLOXACIN LACTATE 400MG/40ML
500 VIAL (ML) INTRAVENOUS EVERY 12 HOURS
Refills: 0 | Status: DISCONTINUED | OUTPATIENT
Start: 2019-10-24 | End: 2019-10-24

## 2019-10-24 RX ORDER — CIPROFLOXACIN LACTATE 400MG/40ML
1 VIAL (ML) INTRAVENOUS
Qty: 14 | Refills: 0
Start: 2019-10-24 | End: 2019-10-30

## 2019-10-24 RX ADMIN — Medication 500 MILLIGRAM(S): at 10:21

## 2019-10-24 RX ADMIN — MEROPENEM 100 MILLIGRAM(S): 1 INJECTION INTRAVENOUS at 05:49

## 2019-10-24 RX ADMIN — SODIUM CHLORIDE 100 MILLILITER(S): 9 INJECTION INTRAMUSCULAR; INTRAVENOUS; SUBCUTANEOUS at 04:49

## 2019-10-24 RX ADMIN — Medication 1 TABLET(S): at 17:09

## 2019-10-24 RX ADMIN — Medication 1 TABLET(S): at 05:49

## 2019-10-24 NOTE — PROGRESS NOTE ADULT - REASON FOR ADMISSION
back pain since last Friday.

## 2019-10-24 NOTE — PROGRESS NOTE ADULT - ASSESSMENT
1.	? Pyelonephritis, Fever: UA unremarkable, ? Hematogenous spread but blood c/s negative so far  2.	Minimal proteinuria    Pt states that she had kideny biopsy in 2010 for proteinuria and hematuria and was told it was normal ( IN [place] ).   Serologies pending. Pt has clinically improved with IV abx. ID follow up. Will follow electrolytes and renal function trend.  D/w patient regarding need for out patient nephrology follow up. D/c plannig.

## 2019-10-24 NOTE — PROGRESS NOTE ADULT - SUBJECTIVE AND OBJECTIVE BOX
ID progress note     Name: TRISTON CARD  Age: 34y  Gender: Female  MRN: [id number]    Interval History-- Events noted , doing well . wants to go home , afebrile . HIDA scan negative . She has hx of proteinuria x 20 years , had extensive work up in [place] including kidney biopsy , she says all was okay . She has had intemittent hematuria  Notes reviewed    Past Medical History--  Proteinuria  History of D&C      For details regarding the patient's social history, family history, and other miscellaneous elements, please refer the initial infectious diseases consultation and/or the admitting history and physical examination for this admission.    Allergies--  Allergies    penicillins (Pruritus; Rash)    Intolerances        Medications--  Antibiotics:  meropenem  IVPB 1000 milliGRAM(s) IV Intermittent every 8 hours    Immunologic:    Other:  acetaminophen   Tablet .. PRN  enoxaparin Injectable  lactobacillus acidophilus  oxyCODONE    5 mG/acetaminophen 325 mG PRN  sodium chloride 0.9%.      Review of Systems--  A 10-point review of systems was obtained.     Pertinent positives and negatives--  Constitutional: No fevers. No Chills. No Rigors.   Cardiovascular: No chest pain. No palpitations.  Respiratory: No shortness of breath. No cough.  Gastrointestinal: No nausea or vomiting. No diarrhea or constipation.   Psychiatric: Pleasant. Appropriate affect.    Review of systems otherwise negative except as previously noted.    Physical Examination--  Vital Signs: T(F): 98.2 (10-24-19 @ 08:58), Max: 98.3 (10-23-19 @ 10:00)  HR: 83 (10-24-19 @ 08:58)  BP: 117/78 (10-24-19 @ 08:58)  RR: 18 (10-24-19 @ 08:58)  SpO2: 97% (10-24-19 @ 08:58)  Wt(kg): --  General: Nontoxic-appearing Female in no acute distress.  HEENT: AT/NC. PERRL. EOMI. Anicteric. Conjunctiva pink and moist. Oropharynx clear. Dentition fair.  Neck: Not rigid. No sense of mass.  Nodes: None palpable.  Lungs: Clear bilaterally without rales, wheezing or rhonchi  Heart: Regular rate and rhythm. No Murmur. No rub. No gallop. No palpable thrill.  Abdomen: Bowel sounds present and normoactive. Soft. Nondistended. Nontender. No sense of mass. No organomegaly. No CVA tenderness   Back: No spinal tenderness. No costovertebral angle tenderness.   Extremities: No cyanosis or clubbing. No edema.   Skin: Warm. Dry. Good turgor. No rash. No vasculitic stigmata.  Psychiatric: Appropriate affect and mood for situation.         Laboratory Studies--  CBC                        10.1   3.71  )-----------( 278      ( 24 Oct 2019 07:42 )             31.1       Chemistries  10-24    137  |  104  |  4<L>  ----------------------------<  121<H>  3.6   |  27  |  0.66    Ca    8.5      24 Oct 2019 07:42    Procalcitonin, Serum (10.22.19 @ 15:53)    Procalcitonin, Serum: 0.60:    Urinalysis Basic - ( 22 Oct 2019 20:07 )    Color: Yellow / Appearance: Clear / S.010 / pH: x  Gluc: x / Ketone: Negative  / Bili: Negative / Urobili: Negative mg/dL   Blood: x / Protein: Negative mg/dL / Nitrite: Negative   Leuk Esterase: Negative / RBC: 11-25 /HPF / WBC 0-2   Sq Epi: x / Non Sq Epi: Few / Bacteria: Few    Protein/Creatinine Ratio, Urine (10.23.19 @ 22:34)    Creatinine, Random Urine: 27: Reference Ranges have NOT been established for random urine analytes due  to variability in fluid intake and concentration. mg/dL    Total Protein, Random Urine: 7 mg/dL    Protein/Creatinine Ratio Calculation: 0.3 Ratio    Sedimentation Rate, Erythrocyte (10.22.19 @ 07:19)    Sedimentation Rate, Erythrocyte: 71 mm/Hr    C-Reactive Protein, Serum (10.22.19 @ 15:53)    C-Reactive Protein, Serum: 15.07 mg/dL    Culture Data    Culture - Blood (collected 20 Oct 2019 20:20)  Source: .Blood  Preliminary Report (21 Oct 2019 21:00):    No growth to date.    Culture - Blood (collected 20 Oct 2019 20:20)  Source: .Blood  Preliminary Report (21 Oct 2019 21:00):    No growth to date.    Culture - Urine (collected 20 Oct 2019 20:05)  Source: .Urine  Final Report (21 Oct 2019 16:27):    <10,000 CFU/mL Normal Urogenital Alis          RADIOLOGY:  NM Hepatobiliary Imaging (10.23.19 @ 14:25) >  FINDINGS: There is prompt, homogeneous uptake of radiotracer by the   hepatocytes. Activity is first seen in the gallbladder at 15 minutes and   in the bowel at 40 minutes. There is good clearance of activity from the   liver by the end of the study.    IMPRESSION: Normal hepatobiliary scan.    No evidence of acute cholecystitis.    US Abdomen Complete (10.22.19 @ 14:25) >  There is a small 4 mm gallbladder polyp. Diffuse gallbladder wall   thickening and small amount of pericholecystic fluid is nonspecific could   be reactive. If there is clinical suspicion for cholecystitis recommend   HIDA scan. No biliary dilatation. Common duct 0.3 cm. Liver unremarkable.   Pancreas unremarkable. Spleen not enlarged.  Right kidney 11.8 the left 13.9 cm. No hydronephrosis or shadowing renal   calculi. There is a 6 mm angiomyolipoma right kidney lower pole.  No hydronephrosis.    Impression:    Small gallbladder polyp, wall thickening pericholecystic fluid. See   discussion above.  Additional findings as discussed    CT Chest w/ Oral Cont and w/ IV Cont (10.22.19 @ 09:13) >  CT chest abdomen and pelvis only IV contrast.  90 cc Omnipaque 350 injected intravenously.  There is a tree-in-bud parenchymal infiltrate in the right upper lobe   suggesting an inflammatory or infectious bronchiolitis. There is an   indeterminate noncalcified 9 mm parenchymal nodule right upper lobe.   Recommend comparison with previous studies if available and if necessary,   CT surveillance in 3 months  Small layering bibasilar pleural effusions with dependent atelectasis.  Central airways patent. No mediastinal adenopathy. Normal caliber   thoracic aorta.  Normal heart size. No pericardial effusion.  No calcified gallstones or biliary dilatation. Small amount   pericholecystic fluid. This is nonspecific could be reactive. However if   there is clinical concern for potential cholecystitis right upper   quadrant ultrasound can be obtained.  Pancreas spleen adrenals not remarkable.  The right kidney is enlarged demonstrates slightly heterogeneous   nephrographic response with perinephric stranding and trace perirenal   fluid. Findings consistent with acute pyelonephritis. There is no vivek   abscess formation. Bilateral tiny renal cortical hypo densities too small   to characterize probable cysts. No hydronephrosis or urolithiasis   bilaterally.  Nonaneurysmal abdominal aorta.  Normal appendix. No inflamed or obstructed bowel.  Trace pelvic ascites. No extraluminal gas or organized collections.  Uterus adnexa bladder not remarkable  No acute or aggressive osseous pathology.    Impression:    Small tree-in-bud right upper lobe infiltrate suggesting an   inflammatory/infectious bronchiolitis.  Indeterminate 9mm parenchymal nodule right upper lobe as described.  Small bilateral pleural effusions with dependent atelectasis.  Nonspecific pericholecystic fluid. Correlate with upper quadrant   ultrasound as clinically indicated.  Acute right pyelonephritis. No vivek abscess formation at this time. No   hydronephrosis  Additional findings as discussed      Assessment--  33yo female with no significant PMH presents with fever, chills and diffuse body aches since Friday worse today associated with dysuria , has right flank pain, CT stone study suspects right pyelonephritis , has allergy to PCN but tolerated Rocephin     She had repeat ct chest/abd and pelvis with IV and oral contrast official report pending  but as per radiology  has perinephric stranding right kidney c/w pyelonephritis . Also shows pericholecystic fluid and RUL nodule    She has a hx of proteinuria by her H & P . She has elevated sed rate and CRP . She could have underlying vasculitis or  auto immune disease causing nephritis     She was seen by Nephrology  and she claims she had extensive work up in [place] for proteinuria     Plan:  - will change to po Cipro 500 mg bid x 7 more days as all cs negative  - she will follow up with nephrology as out patient   - if stays afebrile she can be dc home later today if cleared by nephrology     Continue with present regime .  Appropriate use of antibiotics and adverse effects reviewed.    I have discussed the above plan of care with patient in detail. She expressed understanding of the treatment plan . Risks, benefits and alternatives discussed in detail. I have asked if she has any questions or concerns and appropriately addressed them to the best of my ability .      > 35 minutes spent in direct patient care reviewing  the notes, lab data/ imaging , discussion with multidisciplinary team. All questions were addressed and answered to the best of my capacity .    Thank you for allowing me to participate in the care of your patient .        Lenader Montenegro MD  [phone]

## 2019-10-24 NOTE — DISCHARGE NOTE PROVIDER - NSDCCPCAREPLAN_GEN_ALL_CORE_FT
PRINCIPAL DISCHARGE DIAGNOSIS  Diagnosis: Pyelonephritis  Assessment and Plan of Treatment: Treated with IV Antibiotics and switched to oral Ciprofloxacin for 7 days total. Please return if fevers persist or return.

## 2019-10-24 NOTE — PROGRESS NOTE ADULT - SUBJECTIVE AND OBJECTIVE BOX
Patient is a 34y old  Female who presents with a chief complaint of back pain since last Friday. (24 Oct 2019 09:47)    Patient seen in follow up for pyelonephritis, proteinuria.     PAST MEDICAL HISTORY:  Proteinuria    MEDICATIONS  (STANDING):  ciprofloxacin     Tablet 500 milliGRAM(s) Oral every 12 hours  enoxaparin Injectable 40 milliGRAM(s) SubCutaneous daily  lactobacillus acidophilus 1 Tablet(s) Oral two times a day    MEDICATIONS  (PRN):  acetaminophen   Tablet .. 650 milliGRAM(s) Oral every 6 hours PRN Temp greater or equal to 38.5C (101.3F), Mild Pain (1 - 3)  oxyCODONE    5 mG/acetaminophen 325 mG 1 Tablet(s) Oral every 6 hours PRN Moderate Pain (4 - 6)    T(C): 36.9 (10-24-19 @ 13:23), Max: 36.9 (10-23-19 @ 05:25)  HR: 78 (10-24-19 @ 13:23) (70 - 92)  BP: 117/77 (10-24-19 @ 13:23) (108/72 - 131/89)  RR: 17 (10-24-19 @ 13:23) (17 - 18)  SpO2: 98% (10-24-19 @ 13:23) (97% - 98%)  Wt(kg): --  I&O's Detail    23 Oct 2019 07:  -  24 Oct 2019 07:00  --------------------------------------------------------  IN:    sodium chloride 0.9%: 1200 mL  Total IN: 1200 mL    OUT:  Total OUT: 0 mL    Total NET: 1200 mL      24 Oct 2019 07:  -  24 Oct 2019 14:02  --------------------------------------------------------  IN:    Oral Fluid: 320 mL  Total IN: 320 mL    OUT:  Total OUT: 0 mL    Total NET: 320 mL          PHYSICAL EXAM:  General: NAD  Respiratory: b/l air entry  Cardiovascular: S1 S2  Gastrointestinal: soft  Extremities:  no edema                          10.1   3.71  )-----------( 278      ( 24 Oct 2019 07:42 )             31.1     10-24    137  |  104  |  4<L>  ----------------------------<  121<H>  3.6   |  27  |  0.66    Ca    8.5      24 Oct 2019 07:42      Urinalysis Basic - ( 22 Oct 2019 20:07 )    Color: Yellow / Appearance: Clear / S.010 / pH: x  Gluc: x / Ketone: Negative  / Bili: Negative / Urobili: Negative mg/dL   Blood: x / Protein: Negative mg/dL / Nitrite: Negative   Leuk Esterase: Negative / RBC: 11-25 /HPF / WBC 0-2   Sq Epi: x / Non Sq Epi: Few / Bacteria: Few    Protein/Creatinine Ratio Calculation: 0.3 Ratio (10.23.19 @ 22:34)      Sodium, Serum: 137 (10-24 @ 07:42)  Sodium, Serum: 140 (10-23 @ 07:08)  Sodium, Serum: 137 (10-22 @ 07:19)  Sodium, Serum: 136 (10-21 @ 07:19)    Creatinine, Serum: 0.66 (10-24 @ 07:42)  Creatinine, Serum: 0.59 (10-23 @ 07:08)  Creatinine, Serum: 0.67 (10-22 @ 07:19)  Creatinine, Serum: 0.58 (10-21 @ 07:19)    Potassium, Serum: 3.6 (10-24 @ 07:42)  Potassium, Serum: 3.2 (10-23 @ 07:08)  Potassium, Serum: 3.5 (10-22 @ 07:19)  Potassium, Serum: 2.9 (10-21 @ 07:19)    Hemoglobin: 10.1 (10-24 @ 07:42)  Hemoglobin: 9.7 (10-23 @ 07:08)  Hemoglobin: 9.9 (10-22 @ 07:19)  Hemoglobin: 9.9 (10-21 @ 07:19)

## 2019-10-24 NOTE — DISCHARGE NOTE PROVIDER - HOSPITAL COURSE
34F admitted for admitted for Sepsis secondary to Acute Pyelonephritis.         Pyelonephritis     Urine cultures were negative but CT confirmed Pyelonephritis. ID was consulted.  Patient also had HIDA scan that was negative.  She was initially on IV Antibiotics and switched to PO Cipro to be completed after 7 days.  Patient asked to come back to ER if she becomes febrile again.     Abx broadened to Meropenam but Urine Cultures remain negative so not consistent with

## 2019-10-24 NOTE — DISCHARGE NOTE NURSING/CASE MANAGEMENT/SOCIAL WORK - PATIENT PORTAL LINK FT
You can access the FollowMyHealth Patient Portal offered by [facility] by registering at the following website: [email or web address]. By joining [employer]’s FollowMyHealth portal, you will also be able to view your health information using other applications (apps) compatible with our system.

## 2019-10-24 NOTE — DISCHARGE NOTE PROVIDER - NSDCFUADDINST_GEN_ALL_CORE_FT
Diagnosed with a kidney infection (pyelonephritis). Treated with IV Antibiotics and switched to oral Ciprofloxacin for 7 days total. Please return if fevers persist or return.

## 2019-10-25 LAB
AUTO DIFF PNL BLD: NEGATIVE — SIGNIFICANT CHANGE UP
C-ANCA SER-ACNC: NEGATIVE — SIGNIFICANT CHANGE UP
CULTURE RESULTS: SIGNIFICANT CHANGE UP
CULTURE RESULTS: SIGNIFICANT CHANGE UP
P-ANCA SER-ACNC: NEGATIVE — SIGNIFICANT CHANGE UP
SPECIMEN SOURCE: SIGNIFICANT CHANGE UP
SPECIMEN SOURCE: SIGNIFICANT CHANGE UP

## 2019-10-30 LAB
ANA PAT FLD IF-IMP: ABNORMAL
ANA TITR SER: ABNORMAL
GBM IGG SER-ACNC: <1 AI — SIGNIFICANT CHANGE UP

## 2020-06-03 ENCOUNTER — TRANSCRIPTION ENCOUNTER (OUTPATIENT)
Age: 35
End: 2020-06-03

## 2020-06-04 ENCOUNTER — EMERGENCY (EMERGENCY)
Facility: HOSPITAL | Age: 35
LOS: 1 days | Discharge: HOME | End: 2020-06-04
Attending: EMERGENCY MEDICINE | Admitting: EMERGENCY MEDICINE
Payer: COMMERCIAL

## 2020-06-04 VITALS
RESPIRATION RATE: 14 BRPM | OXYGEN SATURATION: 99 % | TEMPERATURE: 98 F | HEART RATE: 95 BPM | DIASTOLIC BLOOD PRESSURE: 79 MMHG | SYSTOLIC BLOOD PRESSURE: 121 MMHG

## 2020-06-04 VITALS
RESPIRATION RATE: 15 BRPM | OXYGEN SATURATION: 98 % | SYSTOLIC BLOOD PRESSURE: 122 MMHG | HEIGHT: 63 IN | WEIGHT: 149.91 LBS | TEMPERATURE: 98 F | DIASTOLIC BLOOD PRESSURE: 80 MMHG | HEART RATE: 99 BPM

## 2020-06-04 DIAGNOSIS — Z98.890 OTHER SPECIFIED POSTPROCEDURAL STATES: Chronic | ICD-10-CM

## 2020-06-04 LAB
ALBUMIN SERPL ELPH-MCNC: 3.8 G/DL — SIGNIFICANT CHANGE UP (ref 3.3–5)
ALP SERPL-CCNC: 66 U/L — SIGNIFICANT CHANGE UP (ref 30–120)
ALT FLD-CCNC: 22 U/L DA — SIGNIFICANT CHANGE UP (ref 10–60)
ANION GAP SERPL CALC-SCNC: 6 MMOL/L — SIGNIFICANT CHANGE UP (ref 5–17)
APPEARANCE UR: ABNORMAL
APTT BLD: 34.9 SEC — SIGNIFICANT CHANGE UP (ref 28.5–37)
AST SERPL-CCNC: 14 U/L — SIGNIFICANT CHANGE UP (ref 10–40)
BACTERIA # UR AUTO: ABNORMAL
BASOPHILS # BLD AUTO: 0.01 K/UL — SIGNIFICANT CHANGE UP (ref 0–0.2)
BASOPHILS NFR BLD AUTO: 0.2 % — SIGNIFICANT CHANGE UP (ref 0–2)
BILIRUB SERPL-MCNC: 0.6 MG/DL — SIGNIFICANT CHANGE UP (ref 0.2–1.2)
BILIRUB UR-MCNC: NEGATIVE — SIGNIFICANT CHANGE UP
BUN SERPL-MCNC: 16 MG/DL — SIGNIFICANT CHANGE UP (ref 7–23)
CALCIUM SERPL-MCNC: 8.8 MG/DL — SIGNIFICANT CHANGE UP (ref 8.4–10.5)
CHLORIDE SERPL-SCNC: 103 MMOL/L — SIGNIFICANT CHANGE UP (ref 96–108)
CO2 SERPL-SCNC: 29 MMOL/L — SIGNIFICANT CHANGE UP (ref 22–31)
COD CRY URNS QL: ABNORMAL
COLOR SPEC: YELLOW — SIGNIFICANT CHANGE UP
CREAT SERPL-MCNC: 0.65 MG/DL — SIGNIFICANT CHANGE UP (ref 0.5–1.3)
DIFF PNL FLD: ABNORMAL
EOSINOPHIL # BLD AUTO: 0.13 K/UL — SIGNIFICANT CHANGE UP (ref 0–0.5)
EOSINOPHIL NFR BLD AUTO: 2.2 % — SIGNIFICANT CHANGE UP (ref 0–6)
EPI CELLS # UR: ABNORMAL
GLUCOSE SERPL-MCNC: 97 MG/DL — SIGNIFICANT CHANGE UP (ref 70–99)
GLUCOSE UR QL: NEGATIVE MG/DL — SIGNIFICANT CHANGE UP
HCG UR QL: NEGATIVE — SIGNIFICANT CHANGE UP
HCT VFR BLD CALC: 39.2 % — SIGNIFICANT CHANGE UP (ref 34.5–45)
HGB BLD-MCNC: 12.8 G/DL — SIGNIFICANT CHANGE UP (ref 11.5–15.5)
IMM GRANULOCYTES NFR BLD AUTO: 0.2 % — SIGNIFICANT CHANGE UP (ref 0–1.5)
INR BLD: 1.05 RATIO — SIGNIFICANT CHANGE UP (ref 0.88–1.16)
KETONES UR-MCNC: NEGATIVE — SIGNIFICANT CHANGE UP
LEUKOCYTE ESTERASE UR-ACNC: ABNORMAL
LYMPHOCYTES # BLD AUTO: 1.07 K/UL — SIGNIFICANT CHANGE UP (ref 1–3.3)
LYMPHOCYTES # BLD AUTO: 18.4 % — SIGNIFICANT CHANGE UP (ref 13–44)
MCHC RBC-ENTMCNC: 29.2 PG — SIGNIFICANT CHANGE UP (ref 27–34)
MCHC RBC-ENTMCNC: 32.7 GM/DL — SIGNIFICANT CHANGE UP (ref 32–36)
MCV RBC AUTO: 89.5 FL — SIGNIFICANT CHANGE UP (ref 80–100)
MONOCYTES # BLD AUTO: 0.37 K/UL — SIGNIFICANT CHANGE UP (ref 0–0.9)
MONOCYTES NFR BLD AUTO: 6.3 % — SIGNIFICANT CHANGE UP (ref 2–14)
NEUTROPHILS # BLD AUTO: 4.24 K/UL — SIGNIFICANT CHANGE UP (ref 1.8–7.4)
NEUTROPHILS NFR BLD AUTO: 72.7 % — SIGNIFICANT CHANGE UP (ref 43–77)
NITRITE UR-MCNC: NEGATIVE — SIGNIFICANT CHANGE UP
NRBC # BLD: 0 /100 WBCS — SIGNIFICANT CHANGE UP (ref 0–0)
PH UR: 6 — SIGNIFICANT CHANGE UP (ref 5–8)
PLATELET # BLD AUTO: 276 K/UL — SIGNIFICANT CHANGE UP (ref 150–400)
POTASSIUM SERPL-MCNC: 3.5 MMOL/L — SIGNIFICANT CHANGE UP (ref 3.5–5.3)
POTASSIUM SERPL-SCNC: 3.5 MMOL/L — SIGNIFICANT CHANGE UP (ref 3.5–5.3)
PROT SERPL-MCNC: 8.2 G/DL — SIGNIFICANT CHANGE UP (ref 6–8.3)
PROT UR-MCNC: 100 MG/DL
PROTHROM AB SERPL-ACNC: 11.7 SEC — SIGNIFICANT CHANGE UP (ref 10–12.9)
RBC # BLD: 4.38 M/UL — SIGNIFICANT CHANGE UP (ref 3.8–5.2)
RBC # FLD: 12.6 % — SIGNIFICANT CHANGE UP (ref 10.3–14.5)
RBC CASTS # UR COMP ASSIST: ABNORMAL /HPF (ref 0–4)
SODIUM SERPL-SCNC: 138 MMOL/L — SIGNIFICANT CHANGE UP (ref 135–145)
SP GR SPEC: 1.02 — SIGNIFICANT CHANGE UP (ref 1.01–1.02)
UROBILINOGEN FLD QL: NEGATIVE MG/DL — SIGNIFICANT CHANGE UP
WBC # BLD: 5.83 K/UL — SIGNIFICANT CHANGE UP (ref 3.8–10.5)
WBC # FLD AUTO: 5.83 K/UL — SIGNIFICANT CHANGE UP (ref 3.8–10.5)
WBC UR QL: ABNORMAL

## 2020-06-04 PROCEDURE — 70450 CT HEAD/BRAIN W/O DYE: CPT | Mod: 26

## 2020-06-04 PROCEDURE — 36415 COLL VENOUS BLD VENIPUNCTURE: CPT

## 2020-06-04 PROCEDURE — 96374 THER/PROPH/DIAG INJ IV PUSH: CPT

## 2020-06-04 PROCEDURE — 99284 EMERGENCY DEPT VISIT MOD MDM: CPT | Mod: 25

## 2020-06-04 PROCEDURE — 85730 THROMBOPLASTIN TIME PARTIAL: CPT

## 2020-06-04 PROCEDURE — 96375 TX/PRO/DX INJ NEW DRUG ADDON: CPT

## 2020-06-04 PROCEDURE — 81001 URINALYSIS AUTO W/SCOPE: CPT

## 2020-06-04 PROCEDURE — 85027 COMPLETE CBC AUTOMATED: CPT

## 2020-06-04 PROCEDURE — 80053 COMPREHEN METABOLIC PANEL: CPT

## 2020-06-04 PROCEDURE — 85610 PROTHROMBIN TIME: CPT

## 2020-06-04 PROCEDURE — 87086 URINE CULTURE/COLONY COUNT: CPT

## 2020-06-04 PROCEDURE — 70450 CT HEAD/BRAIN W/O DYE: CPT

## 2020-06-04 PROCEDURE — 81025 URINE PREGNANCY TEST: CPT

## 2020-06-04 PROCEDURE — 99284 EMERGENCY DEPT VISIT MOD MDM: CPT

## 2020-06-04 RX ORDER — NITROFURANTOIN MACROCRYSTAL 50 MG
1 CAPSULE ORAL
Qty: 20 | Refills: 0
Start: 2020-06-04 | End: 2020-06-13

## 2020-06-04 RX ORDER — KETOROLAC TROMETHAMINE 30 MG/ML
30 SYRINGE (ML) INJECTION ONCE
Refills: 0 | Status: DISCONTINUED | OUTPATIENT
Start: 2020-06-04 | End: 2020-06-04

## 2020-06-04 RX ORDER — ONDANSETRON 8 MG/1
4 TABLET, FILM COATED ORAL ONCE
Refills: 0 | Status: COMPLETED | OUTPATIENT
Start: 2020-06-04 | End: 2020-06-04

## 2020-06-04 RX ADMIN — Medication 30 MILLIGRAM(S): at 11:15

## 2020-06-04 RX ADMIN — ONDANSETRON 4 MILLIGRAM(S): 8 TABLET, FILM COATED ORAL at 11:15

## 2020-06-04 NOTE — ED PROVIDER NOTE - OBJECTIVE STATEMENT
35 yo F with hx of headaches, pyelonephritis, co intermittent left parietal headache and bilateral hand numbness for 3 days. Seen in UC yesterday and was told to go to ER for CT brain but waited until today. because pain and numbness subsided last night. Now co 3/10 headache, nausea and hand numbness. Denies fever, thunderclap headache, Vomiting, CP, SOB or pregnancy. Has not taken anything for pain and refused pain meds now.  PCP Migdalia

## 2020-06-04 NOTE — ED PROVIDER NOTE - NSFOLLOWUPINSTRUCTIONS_ED_ALL_ED_FT
Take Naprosyn for headache, Macrobid for UTI.  Follow up with Neuro/PCP tomorrow for further eval and treatment.

## 2020-06-04 NOTE — ED PROVIDER NOTE - PATIENT PORTAL LINK FT
You can access the FollowMyHealth Patient Portal offered by [facility] by registering at the following website: [email or web address]. By joining [employer]’s FollowMyHealth portal, you will also be able to view your health information using other applications (apps) compatible with our system.

## 2020-06-04 NOTE — ED PROVIDER NOTE - CARE PLAN
Principal Discharge DX:	Headache Principal Discharge DX:	Headache  Secondary Diagnosis:	UTI (urinary tract infection)

## 2020-06-04 NOTE — ED PROVIDER NOTE - CARE PROVIDER_API CALL
Loretta Ochoa  NEUROLOGY  [address] [place] [address]  Phone: [phone]  Fax: [phone]  Follow Up Time: 1-3 Days

## 2020-06-04 NOTE — ED PROVIDER NOTE - CHPI ED SYMPTOMS NEG
no change in level of consciousness/no blurred vision/no fever/no confusion/no dizziness/no vomiting/no loss of consciousness/no weakness

## 2020-06-04 NOTE — ED PROVIDER NOTE - PROGRESS NOTE DETAILS
Headache gone feels much better. Plan - Naprosyn for headache, Macrobid for UTI. FU Neuro/PCP tomorrow.

## 2020-06-04 NOTE — ED ADULT NURSE NOTE - CHPI ED NUR SYMPTOMS NEG
no dizziness/no decreased eating/drinking/no pain/no vomiting/no chills/no nausea/no weakness/no fever/no tingling

## 2020-06-04 NOTE — ED PROVIDER NOTE - CLINICAL SUMMARY MEDICAL DECISION MAKING FREE TEXT BOX
Headache with nausea and numbness for 3 days. PE unrevealing. Likely migraine. Plan - CT brain, labs, meds, reeval.

## 2020-06-05 LAB
CULTURE RESULTS: SIGNIFICANT CHANGE UP
SPECIMEN SOURCE: SIGNIFICANT CHANGE UP

## 2020-06-09 ENCOUNTER — APPOINTMENT (OUTPATIENT)
Dept: FAMILY MEDICINE | Facility: CLINIC | Age: 35
End: 2020-06-09

## 2020-06-10 LAB
ALBUMIN SERPL ELPH-MCNC: 4.7 G/DL
ALP BLD-CCNC: 75 U/L
ALT SERPL-CCNC: 23 U/L
ANION GAP SERPL CALC-SCNC: 14 MMOL/L
APPEARANCE: ABNORMAL
AST SERPL-CCNC: 20 U/L
BACTERIA: ABNORMAL
BASOPHILS # BLD AUTO: 0.02 K/UL
BASOPHILS NFR BLD AUTO: 0.3 %
BILIRUB SERPL-MCNC: 0.8 MG/DL
BILIRUBIN URINE: NEGATIVE
BLOOD URINE: ABNORMAL
BUN SERPL-MCNC: 11 MG/DL
CALCIUM SERPL-MCNC: 9.4 MG/DL
CHLORIDE SERPL-SCNC: 97 MMOL/L
CHOLEST SERPL-MCNC: 243 MG/DL
CHOLEST/HDLC SERPL: 5.4 RATIO
CO2 SERPL-SCNC: 28 MMOL/L
COLOR: YELLOW
CREAT SERPL-MCNC: 0.7 MG/DL
CREAT SPEC-SCNC: 221 MG/DL
EOSINOPHIL # BLD AUTO: 0.13 K/UL
EOSINOPHIL NFR BLD AUTO: 2.1 %
ESTIMATED AVERAGE GLUCOSE: 111 MG/DL
GLUCOSE QUALITATIVE U: NEGATIVE
GLUCOSE SERPL-MCNC: 98 MG/DL
HBA1C MFR BLD HPLC: 5.5 %
HCT VFR BLD CALC: 41.9 %
HDLC SERPL-MCNC: 45 MG/DL
HGB BLD-MCNC: 13.1 G/DL
HYALINE CASTS: 0 /LPF
IMM GRANULOCYTES NFR BLD AUTO: 0.3 %
KETONES URINE: NEGATIVE
LDLC SERPL CALC-MCNC: 165 MG/DL
LEUKOCYTE ESTERASE URINE: ABNORMAL
LYMPHOCYTES # BLD AUTO: 1.4 K/UL
LYMPHOCYTES NFR BLD AUTO: 22.7 %
MAN DIFF?: NORMAL
MCHC RBC-ENTMCNC: 28.2 PG
MCHC RBC-ENTMCNC: 31.3 GM/DL
MCV RBC AUTO: 90.3 FL
MICROALBUMIN 24H UR DL<=1MG/L-MCNC: 55.8 MG/DL
MICROALBUMIN/CREAT 24H UR-RTO: 252 MG/G
MICROSCOPIC-UA: NORMAL
MONOCYTES # BLD AUTO: 0.47 K/UL
MONOCYTES NFR BLD AUTO: 7.6 %
NEUTROPHILS # BLD AUTO: 4.14 K/UL
NEUTROPHILS NFR BLD AUTO: 67 %
NITRITE URINE: NEGATIVE
PH URINE: 6.5
PLATELET # BLD AUTO: 320 K/UL
POTASSIUM SERPL-SCNC: 3.5 MMOL/L
PROT SERPL-MCNC: 7.8 G/DL
PROTEIN URINE: ABNORMAL
RBC # BLD: 4.64 M/UL
RBC # FLD: 12.8 %
RED BLOOD CELLS URINE: 62 /HPF
SODIUM SERPL-SCNC: 138 MMOL/L
SPECIFIC GRAVITY URINE: 1.02
SQUAMOUS EPITHELIAL CELLS: 25 /HPF
TRIGL SERPL-MCNC: 161 MG/DL
TSH SERPL-ACNC: 0.97 UIU/ML
UROBILINOGEN URINE: NORMAL
WBC # FLD AUTO: 6.18 K/UL
WHITE BLOOD CELLS URINE: 52 /HPF

## 2020-06-15 ENCOUNTER — APPOINTMENT (OUTPATIENT)
Dept: FAMILY MEDICINE | Facility: CLINIC | Age: 35
End: 2020-06-15
Payer: OTHER GOVERNMENT

## 2020-06-15 VITALS
SYSTOLIC BLOOD PRESSURE: 122 MMHG | OXYGEN SATURATION: 98 % | HEART RATE: 82 BPM | WEIGHT: 163 LBS | BODY MASS INDEX: 28.88 KG/M2 | HEIGHT: 63 IN | DIASTOLIC BLOOD PRESSURE: 88 MMHG | TEMPERATURE: 98.2 F

## 2020-06-15 DIAGNOSIS — R40.0 SOMNOLENCE: ICD-10-CM

## 2020-06-15 DIAGNOSIS — R53.83 OTHER FATIGUE: ICD-10-CM

## 2020-06-15 PROCEDURE — 36415 COLL VENOUS BLD VENIPUNCTURE: CPT

## 2020-06-15 PROCEDURE — 99214 OFFICE O/P EST MOD 30 MIN: CPT | Mod: 25

## 2020-06-17 PROBLEM — R53.83 FATIGUE: Status: ACTIVE | Noted: 2020-06-17

## 2020-06-17 PROBLEM — R40.0 DAYTIME SOMNOLENCE: Status: ACTIVE | Noted: 2020-06-17

## 2020-06-17 LAB
ALBUMIN MFR SERPL ELPH: 56.7 %
ALBUMIN SERPL-MCNC: 3.9 G/DL
ALBUMIN/GLOB SERPL: 1.3 RATIO
ALPHA1 GLOB MFR SERPL ELPH: 3.9 %
ALPHA1 GLOB SERPL ELPH-MCNC: 0.3 G/DL
ALPHA2 GLOB MFR SERPL ELPH: 10.4 %
ALPHA2 GLOB SERPL ELPH-MCNC: 0.7 G/DL
B-GLOBULIN MFR SERPL ELPH: 10.6 %
B-GLOBULIN SERPL ELPH-MCNC: 0.7 G/DL
BACTERIA UR CULT: NORMAL
C3 SERPL-MCNC: 121 MG/DL
C4 SERPL-MCNC: 37 MG/DL
CRP SERPL-MCNC: 0.41 MG/DL
DEPRECATED KAPPA LC FREE/LAMBDA SER: 1.22 RATIO
ERYTHROCYTE [SEDIMENTATION RATE] IN BLOOD BY WESTERGREN METHOD: 46 MM/HR
GAMMA GLOB FLD ELPH-MCNC: 1.3 G/DL
GAMMA GLOB MFR SERPL ELPH: 18.4 %
IGA SER QL IEP: 170 MG/DL
IGG SER QL IEP: 1233 MG/DL
IGM SER QL IEP: 131 MG/DL
INTERPRETATION SERPL IEP-IMP: NORMAL
KAPPA LC CSF-MCNC: 1.75 MG/DL
KAPPA LC SERPL-MCNC: 2.14 MG/DL
M PROTEIN SPEC IFE-MCNC: NORMAL
PROT SERPL-MCNC: 6.9 G/DL
PROT SERPL-MCNC: 6.9 G/DL
T3FREE SERPL-MCNC: 2.64 PG/ML
T4 FREE SERPL-MCNC: 1.1 NG/DL
THYROGLOB AB SERPL-ACNC: <20 IU/ML
THYROPEROXIDASE AB SERPL IA-ACNC: <10 IU/ML

## 2020-06-17 NOTE — REVIEW OF SYSTEMS
[Recent Change In Weight] : ~T recent weight change [Fatigue] : fatigue [Negative] : Psychiatric [Fever] : no fever [Chills] : no chills [FreeTextEntry8] : cloudy urine [Night Sweats] : no night sweats

## 2020-06-17 NOTE — COUNSELING
[Potential consequences of obesity discussed] : Potential consequences of obesity discussed [Benefits of weight loss discussed] : Benefits of weight loss discussed [Encouraged to maintain food diary] : Encouraged to maintain food diary [Encouraged to use exercise tracking device] : Encouraged to use exercise tracking device [Encouraged to increase physical activity] : Encouraged to increase physical activity [Target Wt Loss Goal ___] : Weight Loss Goals: Target weight loss goal [unfilled] lbs [Weigh Self Weekly] : weigh self weekly [Decrease Portions] : decrease portions [____ min/wk Activity] : [unfilled] min/wk activity [Keep Food Diary] : keep food diary [None] : None [Needs reinforcement, provided] : Patient needs reinforcement on understanding of lifestyle changes and steps needed to achieve self management goal; reinforcement was provided

## 2020-06-17 NOTE — PLAN
[FreeTextEntry1] : f/u gyn. \par pap smear 6/1/20. \par  low chol. diet. \par declined vaccines. \par weight loss counseling. \par f/u for cpe. \par  labs obtained.

## 2020-06-17 NOTE — PHYSICAL EXAM
[No Acute Distress] : no acute distress [Well-Appearing] : well-appearing [Well Developed] : well developed [Well Nourished] : well nourished [PERRL] : pupils equal round and reactive to light [EOMI] : extraocular movements intact [Normal Sclera/Conjunctiva] : normal sclera/conjunctiva [No JVD] : no jugular venous distention [Normal Oropharynx] : the oropharynx was normal [Normal Outer Ear/Nose] : the outer ears and nose were normal in appearance [Supple] : supple [No Lymphadenopathy] : no lymphadenopathy [Thyroid Normal, No Nodules] : the thyroid was normal and there were no nodules present [No Accessory Muscle Use] : no accessory muscle use [Clear to Auscultation] : lungs were clear to auscultation bilaterally [No Respiratory Distress] : no respiratory distress  [No Murmur] : no murmur heard [Normal S1, S2] : normal S1 and S2 [Regular Rhythm] : with a regular rhythm [Normal Rate] : normal rate  [No Varicosities] : no varicosities [No Abdominal Bruit] : a ~M bruit was not heard ~T in the abdomen [No Carotid Bruits] : no carotid bruits [Pedal Pulses Present] : the pedal pulses are present [No Edema] : there was no peripheral edema [No Palpable Aorta] : no palpable aorta [Non Tender] : non-tender [Soft] : abdomen soft [No Extremity Clubbing/Cyanosis] : no extremity clubbing/cyanosis [Non-distended] : non-distended [No HSM] : no HSM [No Masses] : no abdominal mass palpated [Normal Bowel Sounds] : normal bowel sounds [Normal Posterior Cervical Nodes] : no posterior cervical lymphadenopathy [Normal Anterior Cervical Nodes] : no anterior cervical lymphadenopathy [No Joint Swelling] : no joint swelling [No Spinal Tenderness] : no spinal tenderness [Grossly Normal Strength/Tone] : grossly normal strength/tone [No CVA Tenderness] : no CVA  tenderness [Coordination Grossly Intact] : coordination grossly intact [No Focal Deficits] : no focal deficits [No Rash] : no rash [Normal Affect] : the affect was normal [Normal Gait] : normal gait [Deep Tendon Reflexes (DTR)] : deep tendon reflexes were 2+ and symmetric [Normal Insight/Judgement] : insight and judgment were intact

## 2020-06-17 NOTE — HEALTH RISK ASSESSMENT
[Patient reported PAP Smear was normal] : Patient reported PAP Smear was normal [HIV test declined] : HIV test declined [Hepatitis C test declined] : Hepatitis C test declined [Feels Safe at Home] : Feels safe at home [Fully functional (using the telephone, shopping, preparing meals, housekeeping, doing laundry, using] : Fully functional and needs no help or supervision to perform IADLs (using the telephone, shopping, preparing meals, housekeeping, doing laundry, using transportation, managing medications and managing finances) [Fully functional (bathing, dressing, toileting, transferring, walking, feeding)] : Fully functional (bathing, dressing, toileting, transferring, walking, feeding) [Reports normal functional visual acuity (ie: able to read med bottle)] : Reports normal functional visual acuity [No] : In the past 12 months have you used drugs other than those required for medical reasons? No [No falls in past year] : Patient reported no falls in the past year [0] : 2) Feeling down, depressed, or hopeless: Not at all (0) [Language] : denies difficulty with language [Change in mental status noted] : No change in mental status noted [Handling Complex Tasks] : denies difficulty handling complex tasks [Reports changes in vision] : Reports no changes in vision [Reports changes in hearing] : Reports no changes in hearing [Reports changes in dental health] : Reports no changes in dental health [[id number]] : 0 [PapSmearDate] : 06/20 [] : No

## 2020-06-17 NOTE — HISTORY OF PRESENT ILLNESS
[FreeTextEntry1] : fatigue [de-identified] : here for c/o fatigue for 6 month, pt. can nap easily, denies apnea or snoring. She has h/o proteinuria and is not seeing a specialist.  c/o cloudy urine for a while. no urine pain or burning, urgency, frequency. c/o difficulty losing weight despite exercise. labs reviewed.

## 2020-07-06 ENCOUNTER — APPOINTMENT (OUTPATIENT)
Dept: FAMILY MEDICINE | Facility: CLINIC | Age: 35
End: 2020-07-06
Payer: OTHER GOVERNMENT

## 2020-07-06 ENCOUNTER — NON-APPOINTMENT (OUTPATIENT)
Age: 35
End: 2020-07-06

## 2020-07-06 VITALS
HEART RATE: 89 BPM | DIASTOLIC BLOOD PRESSURE: 84 MMHG | WEIGHT: 166 LBS | HEIGHT: 64 IN | BODY MASS INDEX: 28.34 KG/M2 | TEMPERATURE: 98.3 F | SYSTOLIC BLOOD PRESSURE: 112 MMHG | OXYGEN SATURATION: 98 %

## 2020-07-06 DIAGNOSIS — Z87.440 PERSONAL HISTORY OF URINARY (TRACT) INFECTIONS: ICD-10-CM

## 2020-07-06 DIAGNOSIS — R70.0 ELEVATED ERYTHROCYTE SEDIMENTATION RATE: ICD-10-CM

## 2020-07-06 DIAGNOSIS — R82.90 UNSPECIFIED ABNORMAL FINDINGS IN URINE: ICD-10-CM

## 2020-07-06 DIAGNOSIS — E66.3 OVERWEIGHT: ICD-10-CM

## 2020-07-06 DIAGNOSIS — Z00.00 ENCOUNTER FOR GENERAL ADULT MEDICAL EXAMINATION W/OUT ABNORMAL FINDINGS: ICD-10-CM

## 2020-07-06 DIAGNOSIS — E78.5 HYPERLIPIDEMIA, UNSPECIFIED: ICD-10-CM

## 2020-07-06 DIAGNOSIS — R22.2 LOCALIZED SWELLING, MASS AND LUMP, TRUNK: ICD-10-CM

## 2020-07-06 DIAGNOSIS — Z23 ENCOUNTER FOR IMMUNIZATION: ICD-10-CM

## 2020-07-06 DIAGNOSIS — Z92.29 PERSONAL HISTORY OF OTHER DRUG THERAPY: ICD-10-CM

## 2020-07-06 PROCEDURE — G0444 DEPRESSION SCREEN ANNUAL: CPT | Mod: 59

## 2020-07-06 PROCEDURE — 93000 ELECTROCARDIOGRAM COMPLETE: CPT | Mod: 59

## 2020-07-06 PROCEDURE — 99395 PREV VISIT EST AGE 18-39: CPT | Mod: 25

## 2020-07-06 RX ORDER — NITROFURANTOIN MACROCRYSTALS 100 MG/1
100 CAPSULE ORAL
Qty: 14 | Refills: 0 | Status: DISCONTINUED | COMMUNITY
Start: 2020-06-15 | End: 2020-07-06

## 2020-07-06 NOTE — PHYSICAL EXAM
[No Acute Distress] : no acute distress [Well Nourished] : well nourished [Well Developed] : well developed [Well-Appearing] : well-appearing [Normal Sclera/Conjunctiva] : normal sclera/conjunctiva [PERRL] : pupils equal round and reactive to light [EOMI] : extraocular movements intact [Normal Outer Ear/Nose] : the outer ears and nose were normal in appearance [Normal Oropharynx] : the oropharynx was normal [No JVD] : no jugular venous distention [No Lymphadenopathy] : no lymphadenopathy [Supple] : supple [Thyroid Normal, No Nodules] : the thyroid was normal and there were no nodules present [No Respiratory Distress] : no respiratory distress  [No Accessory Muscle Use] : no accessory muscle use [Clear to Auscultation] : lungs were clear to auscultation bilaterally [Normal Rate] : normal rate  [Regular Rhythm] : with a regular rhythm [Normal S1, S2] : normal S1 and S2 [No Murmur] : no murmur heard [No Carotid Bruits] : no carotid bruits [No Abdominal Bruit] : a ~M bruit was not heard ~T in the abdomen [No Varicosities] : no varicosities [Pedal Pulses Present] : the pedal pulses are present [No Edema] : there was no peripheral edema [No Palpable Aorta] : no palpable aorta [No Extremity Clubbing/Cyanosis] : no extremity clubbing/cyanosis [Soft] : abdomen soft [Non Tender] : non-tender [Non-distended] : non-distended [No Masses] : no abdominal mass palpated [No HSM] : no HSM [Normal Bowel Sounds] : normal bowel sounds [Normal Posterior Cervical Nodes] : no posterior cervical lymphadenopathy [Normal Anterior Cervical Nodes] : no anterior cervical lymphadenopathy [No CVA Tenderness] : no CVA  tenderness [No Joint Swelling] : no joint swelling [No Spinal Tenderness] : no spinal tenderness [Grossly Normal Strength/Tone] : grossly normal strength/tone [No Rash] : no rash [Coordination Grossly Intact] : coordination grossly intact [No Focal Deficits] : no focal deficits [Normal Gait] : normal gait [Deep Tendon Reflexes (DTR)] : deep tendon reflexes were 2+ and symmetric [Normal Affect] : the affect was normal [Normal Insight/Judgement] : insight and judgment were intact

## 2020-07-06 NOTE — COUNSELING
[Behavioral health counseling provided] : Behavioral health counseling provided [Engage in a relaxing activity] : Engage in a relaxing activity [None] : None [Benefits of weight loss discussed] : Benefits of weight loss discussed [Encouraged to maintain food diary] : Encouraged to maintain food diary [Encouraged to increase physical activity] : Encouraged to increase physical activity [Weigh Self Weekly] : weigh self weekly [Encouraged to use exercise tracking device] : Encouraged to use exercise tracking device [____ min/wk Activity] : [unfilled] min/wk activity [Decrease Portions] : decrease portions [Keep Food Diary] : keep food diary [Needs reinforcement, provided] : Patient needs reinforcement on understanding of disease, goals and obesity follow-up plan; reinforcement was provided

## 2020-07-06 NOTE — HEALTH RISK ASSESSMENT
[Good] : ~his/her~  mood as  good [With Patient/Caregiver] : With Patient/Caregiver [Aggressive treatment] : aggressive treatment [No] : In the past 12 months have you used drugs other than those required for medical reasons? No [No falls in past year] : Patient reported no falls in the past year [0] : 2) Feeling down, depressed, or hopeless: Not at all (0) [No Retinopathy] : No retinopathy [HIV test declined] : HIV test declined [Hepatitis C test declined] : Hepatitis C test declined [None] : None [With Family] : lives with family [Employed] : employed [] :  [Sexually Active] : sexually active [# Of Children ___] : has [unfilled] children [Feels Safe at Home] : Feels safe at home [Fully functional (bathing, dressing, toileting, transferring, walking, feeding)] : Fully functional (bathing, dressing, toileting, transferring, walking, feeding) [Fully functional (using the telephone, shopping, preparing meals, housekeeping, doing laundry, using] : Fully functional and needs no help or supervision to perform IADLs (using the telephone, shopping, preparing meals, housekeeping, doing laundry, using transportation, managing medications and managing finances) [Independent] : managing finances [Reports normal functional visual acuity (ie: able to read med bottle)] : Reports normal functional visual acuity [Smoke Detector] : smoke detector [Carbon Monoxide Detector] : carbon monoxide detector [Seat Belt] :  uses seat belt [Sunscreen] : uses sunscreen [] : No [de-identified] : regular [[id number]] : 0 [de-identified] : exercises 1-2 hour daily [EyeExamDate] : 02/20 [Language] : denies difficulty with language [Behavior] : denies difficulty with behavior [Change in mental status noted] : No change in mental status noted [Handling Complex Tasks] : denies difficulty handling complex tasks [Reasoning] : denies difficulty with reasoning [Learning/Retaining New Information] : denies difficulty learning/retaining new information [Reports changes in vision] : Reports no changes in vision [Reports changes in hearing] : Reports no changes in hearing [Spatial Ability and Orientation] : denies difficulty with spatial ability and orientation [Reports changes in dental health] : Reports no changes in dental health [PapSmearComments] : PT. WILL F/U GYN  [PapSmearDate] : NEVER [FreeTextEntry2] :   [AdvancecareDate] : 07/20

## 2020-07-06 NOTE — PLAN
[FreeTextEntry1] : labs reviewed.\par  f/u kidney specialist.\par  recommend low chol. diet.pt. declined statin.\par continue exercise. \par Red yeast rice and CoQ10. \par  repeat labs in 3 months.\par  f/u OB/GYN for pap smear and advice regarding pregnancy risks -pt. wants to get Pregnant.She is currently using protection.\par  repeat urine test today. \par  take wholesome plant based diet. avoid nephrotoxins.

## 2020-07-06 NOTE — HISTORY OF PRESENT ILLNESS
[FreeTextEntry1] : CPE [de-identified] : CPE. HERE TO DISCUSS LABS. sHE HAS LONG H/O PROTEINURIA. Her mother has h/o renal failure.Patient has gained 22 pounds in last 2 years. She had last eye exam at [facility].

## 2020-07-09 LAB
APPEARANCE: CLEAR
BACTERIA UR CULT: NORMAL
BACTERIA: NEGATIVE
BILIRUBIN URINE: NEGATIVE
BLOOD URINE: ABNORMAL
COLOR: NORMAL
GLUCOSE QUALITATIVE U: NEGATIVE
HYALINE CASTS: 2 /LPF
KETONES URINE: NEGATIVE
LEUKOCYTE ESTERASE URINE: NEGATIVE
MICROSCOPIC-UA: NORMAL
NITRITE URINE: NEGATIVE
PH URINE: 6
PROTEIN URINE: ABNORMAL
RED BLOOD CELLS URINE: 18 /HPF
SPECIFIC GRAVITY URINE: 1.02
SQUAMOUS EPITHELIAL CELLS: 3 /HPF
UROBILINOGEN URINE: NORMAL
WHITE BLOOD CELLS URINE: 1 /HPF

## 2020-07-15 ENCOUNTER — LABORATORY RESULT (OUTPATIENT)
Age: 35
End: 2020-07-15

## 2020-07-15 ENCOUNTER — APPOINTMENT (OUTPATIENT)
Dept: NEPHROLOGY | Facility: CLINIC | Age: 35
End: 2020-07-15
Payer: OTHER GOVERNMENT

## 2020-07-15 VITALS
SYSTOLIC BLOOD PRESSURE: 119 MMHG | DIASTOLIC BLOOD PRESSURE: 84 MMHG | HEIGHT: 64 IN | HEART RATE: 92 BPM | WEIGHT: 162.48 LBS | BODY MASS INDEX: 27.74 KG/M2 | OXYGEN SATURATION: 98 %

## 2020-07-15 DIAGNOSIS — Z84.1 FAMILY HISTORY OF DISORDERS OF KIDNEY AND URETER: ICD-10-CM

## 2020-07-15 DIAGNOSIS — Z83.3 FAMILY HISTORY OF DIABETES MELLITUS: ICD-10-CM

## 2020-07-15 PROCEDURE — 99204 OFFICE O/P NEW MOD 45 MIN: CPT | Mod: GC

## 2020-07-16 LAB
ALBUMIN SERPL ELPH-MCNC: 5.1 G/DL
ANION GAP SERPL CALC-SCNC: 16 MMOL/L
APPEARANCE: CLEAR
BACTERIA: NEGATIVE
BASOPHILS # BLD AUTO: 0.03 K/UL
BASOPHILS NFR BLD AUTO: 0.5 %
BILIRUBIN URINE: NEGATIVE
BLOOD URINE: ABNORMAL
BUN SERPL-MCNC: 11 MG/DL
CALCIUM SERPL-MCNC: 9.9 MG/DL
CARDIOLIPIN AB SER IA-ACNC: POSITIVE
CHLORIDE SERPL-SCNC: 97 MMOL/L
CHOLEST SERPL-MCNC: 247 MG/DL
CHOLEST/HDLC SERPL: 5 RATIO
CO2 SERPL-SCNC: 25 MMOL/L
COLOR: YELLOW
CREAT SERPL-MCNC: 0.65 MG/DL
DSDNA AB SER-ACNC: 33 IU/ML
EOSINOPHIL # BLD AUTO: 0.16 K/UL
EOSINOPHIL NFR BLD AUTO: 2.5 %
GLUCOSE QUALITATIVE U: NEGATIVE
GLUCOSE SERPL-MCNC: 91 MG/DL
HAV IGM SER QL: NONREACTIVE
HBV CORE IGM SER QL: NONREACTIVE
HBV SURFACE AG SER QL: NONREACTIVE
HCT VFR BLD CALC: 43.9 %
HCV AB SER QL: NONREACTIVE
HCV S/CO RATIO: 0.17 S/CO
HDLC SERPL-MCNC: 49 MG/DL
HGB BLD-MCNC: 13.9 G/DL
HIV1+2 AB SPEC QL IA.RAPID: NONREACTIVE
HYALINE CASTS: 1 /LPF
IMM GRANULOCYTES NFR BLD AUTO: 0.2 %
KETONES URINE: NEGATIVE
LDLC SERPL CALC-MCNC: 163 MG/DL
LEUKOCYTE ESTERASE URINE: NEGATIVE
LUPUS ANTICOAGULANT CASCADE REFLEX: NORMAL
LYMPHOCYTES # BLD AUTO: 1.62 K/UL
LYMPHOCYTES NFR BLD AUTO: 24.8 %
MAN DIFF?: NORMAL
MCHC RBC-ENTMCNC: 29.1 PG
MCHC RBC-ENTMCNC: 31.7 GM/DL
MCV RBC AUTO: 92 FL
MICROSCOPIC-UA: NORMAL
MONOCYTES # BLD AUTO: 0.5 K/UL
MONOCYTES NFR BLD AUTO: 7.7 %
NEUTROPHILS # BLD AUTO: 4.21 K/UL
NEUTROPHILS NFR BLD AUTO: 64.3 %
NITRITE URINE: NEGATIVE
PH URINE: 7
PHOSPHATE SERPL-MCNC: 3.1 MG/DL
PLATELET # BLD AUTO: 331 K/UL
POTASSIUM SERPL-SCNC: 3.7 MMOL/L
PROTEIN URINE: ABNORMAL
RBC # BLD: 4.77 M/UL
RBC # FLD: 13.2 %
RED BLOOD CELLS URINE: 25 /HPF
SODIUM SERPL-SCNC: 138 MMOL/L
SPECIFIC GRAVITY URINE: 1.02
SQUAMOUS EPITHELIAL CELLS: 3 /HPF
TRIGL SERPL-MCNC: 174 MG/DL
UROBILINOGEN URINE: NORMAL
WBC # FLD AUTO: 6.53 K/UL
WHITE BLOOD CELLS URINE: 1 /HPF

## 2020-07-16 NOTE — REVIEW OF SYSTEMS
[Feeling Tired] : feeling tired [Chills] : no chills [Fever] : no fever [Eye Pain] : no eye pain [Heart Rate Is Slow] : the heart rate was not slow [Eyesight Problems] : no eyesight problems [Loss Of Hearing] : no hearing loss [Heart Rate Is Fast] : the heart rate was not fast [Shortness Of Breath] : no shortness of breath [Cough] : no cough [SOB on Exertion] : no shortness of breath during exertion [Abdominal Pain] : no abdominal pain [Vomiting] : no vomiting [Constipation] : no constipation [Dysuria] : no dysuria [Diarrhea] : no diarrhea [Arthralgias] : no arthralgias [Joint Pain] : no joint pain

## 2020-07-16 NOTE — END OF VISIT
[] : Fellow [FreeTextEntry3] : \par Ms Li is a 33 y/o woman with long standing h/o microscopic hematuria. Also noted to have proteinuria. Renal function is stable. +ve family history with mother with ? ? kidney disease. 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 TBMN is typically associated with only hematuria but patients can also have proteinuria ( although low grade). Given the normal renal function, normal BP there is \par no  indication for a biopsy and patient can be monitored closely for now. Given the late onset of , will also evaluate for APL antibodies.

## 2020-07-16 NOTE — HISTORY OF PRESENT ILLNESS
[FreeTextEntry1] : Mrs. Li is a 34y F with hx of schwannoma s/p excision in 2018 presenting to Nephrology Clinic for initial evaluation of proteinuria/hematuria \par \par She endorses a family hx of proteinuria in her mother started in childhood, in addition Mrs. Li was noted to have proteinuria since she was 11. Mother currently 65 years olds, in [place], doing well, not on HD nor any medications for her "kidney disease".  When Mrs. Li was 20 years old she was noted to have RBC in her urine. Had a hx of spontaneous  in , denies prior miscarriages, blood clots, etc. Currently attempting to conceive. She denies any medication use, no NSAID or OTC. Works as an . Regular menstrual cycles.\par \par Labs from 2020 reviewed: Scr: .7, no anemia/thrombocytopenia noted on CBC. SPEP/PRASHANTH/FLC without M spike, monoclonality, or elevated FLC production. Complement levels WNL. UA from 2018 with leukocyturia, hematuria, 300 protein, high specific gravity. Repeat UA in 2020 with persistent hematuria, leukocyturia, 100 protein, large LE concerning for UTI and treated with Macrobid. Repeat UA in 2020 with 18 rbc, no WBC, large blood, 30 protein. Other labs from 2020 notable for elevated ESR of 46 and CRP of .41.

## 2020-07-16 NOTE — ASSESSMENT
[FreeTextEntry1] : 34y F with family hx of proteinura, childhood hx of proteinuria presenting to Nephrology office for proteinuria/hematuria evaluation\par \par #Hematuria/proteinuria \par - Pt with microscopic hematuria and proteinuria for reportedly years, normal kidney fxn, Scr ~.7mg/dl\par - TP/Cr noted to be .8g in Aug 2018\par - Given the family hx could be familial IgA nephropathy vs thin basement disease \par - Will check labs, renal sono\par - Check UA, urine TP/Cr, will spin the urine to look for dysmorphic RBC's\par - Unable to place patient on ACE-I/ARB for proteinuric benefit as she will be actively trying to conceive. May require addition in the future\par - We discussed in great detail that the only way to definitively know the diagnosis would be to pursue a kidney biopsy, however at this time as the hematuria/proteinuria have been stable for several years and she is actively trying to conceive; the management ultimately would not change at this time. However, if proteinuria or kidney function begins to worsen in the future then a kidney biopsy would be pertinent. \par \par Discussed labs with the patient; DSDNA slightly elevated, PTT elevated as well. UA with persistent hematuria/proteinuria, stable kidney fxn. GIven hx of spontaneous  after 12 weeks of gestation, mildly abnormal levels of antiphospholipid ab will refer pt to Rheum, especially as she is planning to attempt to conceive.

## 2020-07-16 NOTE — PHYSICAL EXAM
[General Appearance - Alert] : alert [General Appearance - In No Acute Distress] : in no acute distress [General Appearance - Well Nourished] : well nourished [General Appearance - Well Developed] : well developed [Extraocular Movements] : extraocular movements were intact [Sclera] : the sclera and conjunctiva were normal [Hearing Threshold Finger Rub Not [place]] : hearing was normal [Neck Cervical Mass (___cm)] : no neck mass was observed [Neck Appearance] : the appearance of the neck was normal [Respiration, Rhythm And Depth] : normal respiratory rhythm and effort [] : no respiratory distress [Auscultation Breath Sounds / Voice Sounds] : lungs were clear to auscultation bilaterally [Heart Sounds] : normal S1 and S2 [Murmurs] : no murmurs [Edema] : there was no peripheral edema [Bowel Sounds] : normal bowel sounds [Abdomen Tenderness] : non-tender [Abdomen Soft] : soft [No CVA Tenderness] : no ~M costovertebral angle tenderness [Skin Color & Pigmentation] : normal skin color and pigmentation [Abnormal Walk] : normal gait [No Focal Deficits] : no focal deficits [FreeTextEntry1] : Mosquito bites with excoriations noted on the LE's

## 2020-07-17 ENCOUNTER — OUTPATIENT (OUTPATIENT)
Dept: OUTPATIENT SERVICES | Facility: HOSPITAL | Age: 35
LOS: 1 days | End: 2020-07-17
Payer: COMMERCIAL

## 2020-07-17 ENCOUNTER — APPOINTMENT (OUTPATIENT)
Dept: ULTRASOUND IMAGING | Facility: CLINIC | Age: 35
End: 2020-07-17
Payer: OTHER GOVERNMENT

## 2020-07-17 DIAGNOSIS — R80.9 PROTEINURIA, UNSPECIFIED: ICD-10-CM

## 2020-07-17 DIAGNOSIS — Z98.890 OTHER SPECIFIED POSTPROCEDURAL STATES: Chronic | ICD-10-CM

## 2020-07-17 LAB
ANA PAT FLD IF-IMP: ABNORMAL
ANA SER IF-ACNC: ABNORMAL
CREAT SPEC-SCNC: 117 MG/DL
CREAT/PROT UR: 1 RATIO
ENA RNP AB SER IA-ACNC: 0.2 AL
ENA SM AB SER IA-ACNC: <0.2 AL
ENA SS-A AB SER IA-ACNC: 1.4 AL
ENA SS-B AB SER IA-ACNC: <0.2 AL
PROT UR-MCNC: 111 MG/DL

## 2020-07-17 PROCEDURE — 76770 US EXAM ABDO BACK WALL COMP: CPT

## 2020-07-17 PROCEDURE — 76770 US EXAM ABDO BACK WALL COMP: CPT | Mod: 26

## 2020-07-20 LAB — GBM AB TITR SER IF: <1 AL

## 2020-07-24 ENCOUNTER — APPOINTMENT (OUTPATIENT)
Dept: UROLOGY | Facility: CLINIC | Age: 35
End: 2020-07-24
Payer: OTHER GOVERNMENT

## 2020-07-24 VITALS — TEMPERATURE: 96.7 F

## 2020-07-24 DIAGNOSIS — R31.29 OTHER MICROSCOPIC HEMATURIA: ICD-10-CM

## 2020-07-24 DIAGNOSIS — R80.9 PROTEINURIA, UNSPECIFIED: ICD-10-CM

## 2020-07-24 DIAGNOSIS — Z84.1 FAMILY HISTORY OF DISORDERS OF KIDNEY AND URETER: ICD-10-CM

## 2020-07-24 DIAGNOSIS — D17.71 BENIGN LIPOMATOUS NEOPLASM OF KIDNEY: ICD-10-CM

## 2020-07-24 PROCEDURE — 99204 OFFICE O/P NEW MOD 45 MIN: CPT

## 2020-07-24 NOTE — ASSESSMENT
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 No need for further workup\par - Reviewed all recent labwork. Repeat urine culture\par - Discussed possible etiologies for microhematuria including benign (UTI, nephrolithiasis, cyst, BPH) vs malignancy (renal, ureteral and bladder). Discussed hematuria workup which includes upper tract imaging and cystoscopy. Discussed risk and benefits of cystoscopy.\par

## 2020-07-24 NOTE — HISTORY OF PRESENT ILLNESS
[FreeTextEntry1] : 35 yo F with history of proteinuria and microhematuria closely followed by nephrology\par Denies any gross hematuria\par Last UTI was in December\par Denies any significant bothersome LUTS or flank pain\par Drinks 400ml of water, no coffee, tea or soda daily\par LMP - 6/28\par Recent US showed a small stable AML\par

## 2020-08-03 LAB
APPEARANCE: CLEAR
BACTERIA UR CULT: NORMAL
BACTERIA: NEGATIVE
BILIRUBIN URINE: NEGATIVE
BLOOD URINE: ABNORMAL
COLOR: YELLOW
GLUCOSE QUALITATIVE U: NEGATIVE
HYALINE CASTS: 2 /LPF
KETONES URINE: NEGATIVE
LEUKOCYTE ESTERASE URINE: NEGATIVE
MICROSCOPIC-UA: NORMAL
NITRITE URINE: NEGATIVE
PH URINE: 7.5
PROTEIN URINE: ABNORMAL
RED BLOOD CELLS URINE: 65 /HPF
SPECIFIC GRAVITY URINE: 1.03
SQUAMOUS EPITHELIAL CELLS: 4 /HPF
UROBILINOGEN URINE: ABNORMAL
WHITE BLOOD CELLS URINE: 1 /HPF

## 2020-08-07 ENCOUNTER — APPOINTMENT (OUTPATIENT)
Age: 35
End: 2020-08-07

## 2022-01-28 NOTE — H&P PST ADULT - DENTITION
[de-identified] : Healthy eating and activities [None] : None [Good understanding] : Patient has a good understanding of lifestyle changes and steps needed to achieve self management goal denies loose teeth/dentures/dental implants/normal

## 2024-09-24 NOTE — PHYSICAL EXAM
[Normal Appearance] : normal appearance [General Appearance - Well Nourished] : well nourished [General Appearance - Well Developed] : well developed [Well Groomed] : well groomed [General Appearance - In No Acute Distress] : no acute distress [Edema] : no peripheral edema [Abdomen Soft] : soft [Exaggerated Use Of Accessory Muscles For Inspiration] : no accessory muscle use [Respiration, Rhythm And Depth] : normal respiratory rhythm and effort [Abdomen Tenderness] : non-tender [Costovertebral Angle Tenderness] : no ~M costovertebral angle tenderness [Normal Station and Gait] : the gait and station were normal for the patient's age [Urinary Bladder Findings] : the bladder was normal on palpation [] : no rash [No Focal Deficits] : no focal deficits [Oriented To Time, Place, And Person] : oriented to person, place, and time [Affect] : the affect was normal [Mood] : the mood was normal [Not Anxious] : not anxious [No Palpable Adenopathy] : no palpable adenopathy .